# Patient Record
Sex: FEMALE | Race: WHITE | NOT HISPANIC OR LATINO | Employment: FULL TIME | URBAN - METROPOLITAN AREA
[De-identification: names, ages, dates, MRNs, and addresses within clinical notes are randomized per-mention and may not be internally consistent; named-entity substitution may affect disease eponyms.]

---

## 2018-08-28 ENCOUNTER — CONVERSION ENCOUNTER (OUTPATIENT)
Dept: INTERNAL MEDICINE | Facility: CLINIC | Age: 23
End: 2018-08-28

## 2018-08-28 ENCOUNTER — OFFICE VISIT CONVERTED (OUTPATIENT)
Dept: INTERNAL MEDICINE | Facility: CLINIC | Age: 23
End: 2018-08-28
Attending: INTERNAL MEDICINE

## 2019-07-29 ENCOUNTER — HOSPITAL ENCOUNTER (OUTPATIENT)
Dept: OTHER | Facility: HOSPITAL | Age: 24
Discharge: HOME | End: 2019-07-29
Attending: INTERNAL MEDICINE

## 2019-07-29 ENCOUNTER — OFFICE VISIT CONVERTED (OUTPATIENT)
Dept: INTERNAL MEDICINE | Facility: CLINIC | Age: 24
End: 2019-07-29
Attending: INTERNAL MEDICINE

## 2019-07-29 ENCOUNTER — CONVERSION ENCOUNTER (OUTPATIENT)
Dept: INTERNAL MEDICINE | Facility: CLINIC | Age: 24
End: 2019-07-29

## 2019-07-29 LAB
ALBUMIN SERPL-MCNC: 4.3 G/DL (ref 3.5–5)
ALBUMIN/GLOB SERPL: 1.3 {RATIO} (ref 1.4–2.6)
ALP SERPL-CCNC: 112 U/L (ref 42–98)
ALT SERPL-CCNC: 23 U/L (ref 10–40)
ANION GAP SERPL CALC-SCNC: 23 MMOL/L (ref 8–19)
AST SERPL-CCNC: 29 U/L (ref 15–50)
BASOPHILS # BLD AUTO: 0.06 10*3/UL (ref 0–0.2)
BASOPHILS NFR BLD AUTO: 1.2 % (ref 0–3)
BILIRUB SERPL-MCNC: 0.32 MG/DL (ref 0.2–1.3)
BUN SERPL-MCNC: 17 MG/DL (ref 5–25)
BUN/CREAT SERPL: 22 {RATIO} (ref 6–20)
CALCIUM SERPL-MCNC: 9.5 MG/DL (ref 8.7–10.4)
CHLORIDE SERPL-SCNC: 92 MMOL/L (ref 99–111)
CHOLEST SERPL-MCNC: 142 MG/DL (ref 107–200)
CHOLEST/HDLC SERPL: 2.4 {RATIO} (ref 3–6)
CONV ABS IMM GRAN: 0.03 10*3/UL (ref 0–0.2)
CONV CO2: 23 MMOL/L (ref 22–32)
CONV CREATININE URINE, RANDOM: 177.6 MG/DL (ref 10–300)
CONV IMMATURE GRAN: 0.6 % (ref 0–1.8)
CONV MICROALBUM.,U,RANDOM: <12 MG/L (ref 0–20)
CONV TOTAL PROTEIN: 7.5 G/DL (ref 6.3–8.2)
CREAT UR-MCNC: 0.76 MG/DL (ref 0.5–0.9)
DEPRECATED RDW RBC AUTO: 37.7 FL (ref 36.4–46.3)
EOSINOPHIL # BLD AUTO: 0.19 10*3/UL (ref 0–0.7)
EOSINOPHIL # BLD AUTO: 3.7 % (ref 0–7)
ERYTHROCYTE [DISTWIDTH] IN BLOOD BY AUTOMATED COUNT: 11.8 % (ref 11.7–14.4)
EST. AVERAGE GLUCOSE BLD GHB EST-MCNC: 235 MG/DL
FOLATE SERPL-MCNC: 15.9 NG/ML (ref 4.8–20)
GFR SERPLBLD BASED ON 1.73 SQ M-ARVRAT: >60 ML/MIN/{1.73_M2}
GLOBULIN UR ELPH-MCNC: 3.2 G/DL (ref 2–3.5)
GLUCOSE SERPL-MCNC: 339 MG/DL (ref 65–99)
HBA1C MFR BLD: 13.4 G/DL (ref 12–16)
HBA1C MFR BLD: 9.8 % (ref 3.5–5.7)
HCT VFR BLD AUTO: 40.4 % (ref 37–47)
HDLC SERPL-MCNC: 58 MG/DL (ref 40–60)
IRON SATN MFR SERPL: 13 % (ref 20–55)
IRON SERPL-MCNC: 54 UG/DL (ref 60–170)
LDLC SERPL CALC-MCNC: 71 MG/DL (ref 70–100)
LYMPHOCYTES # BLD AUTO: 2.24 10*3/UL (ref 1–5)
MAGNESIUM SERPL-MCNC: 1.69 MG/DL (ref 1.6–2.3)
MCH RBC QN AUTO: 29.6 PG (ref 27–31)
MCHC RBC AUTO-ENTMCNC: 33.2 G/DL (ref 33–37)
MCV RBC AUTO: 89.2 FL (ref 81–99)
MICROALBUMIN/CREAT UR: 6.8 MG/G{CRE} (ref 0–35)
MONOCYTES # BLD AUTO: 0.56 10*3/UL (ref 0.2–1.2)
MONOCYTES NFR BLD AUTO: 10.8 % (ref 3–10)
NEUTROPHILS # BLD AUTO: 2.09 10*3/UL (ref 2–8)
NEUTROPHILS NFR BLD AUTO: 40.4 % (ref 30–85)
NRBC CBCN: 0 % (ref 0–0.7)
OSMOLALITY SERPL CALC.SUM OF ELEC: 293 MOSM/KG (ref 273–304)
PLATELET # BLD AUTO: 402 10*3/UL (ref 130–400)
PMV BLD AUTO: 11 FL (ref 9.4–12.3)
POTASSIUM SERPL-SCNC: 4.1 MMOL/L (ref 3.5–5.3)
RBC # BLD AUTO: 4.53 10*6/UL (ref 4.2–5.4)
SODIUM SERPL-SCNC: 134 MMOL/L (ref 135–147)
T4 FREE SERPL-MCNC: 1.5 NG/DL (ref 0.9–1.8)
TIBC SERPL-MCNC: 430 UG/DL (ref 245–450)
TRANSFERRIN SERPL-MCNC: 301 MG/DL (ref 250–380)
TRIGL SERPL-MCNC: 67 MG/DL (ref 40–150)
TSH SERPL-ACNC: 3.46 M[IU]/L (ref 0.27–4.2)
VARIANT LYMPHS NFR BLD MANUAL: 43.3 % (ref 20–45)
VIT B12 SERPL-MCNC: 324 PG/ML (ref 211–911)
VLDLC SERPL-MCNC: 13 MG/DL (ref 5–37)
WBC # BLD AUTO: 5.17 10*3/UL (ref 4.8–10.8)

## 2019-07-31 LAB — CONV ANTI NUCLEAR ANTIBODY WITH REFLEX: NEGATIVE

## 2019-08-29 ENCOUNTER — OFFICE VISIT CONVERTED (OUTPATIENT)
Dept: DIABETES SERVICES | Facility: HOSPITAL | Age: 24
End: 2019-08-29
Attending: NURSE PRACTITIONER

## 2019-08-29 ENCOUNTER — HOSPITAL ENCOUNTER (OUTPATIENT)
Dept: DIABETES SERVICES | Facility: HOSPITAL | Age: 24
Discharge: HOME | End: 2019-08-29
Attending: NURSE PRACTITIONER

## 2021-05-15 VITALS
BODY MASS INDEX: 25.16 KG/M2 | HEART RATE: 103 BPM | RESPIRATION RATE: 16 BRPM | WEIGHT: 166 LBS | OXYGEN SATURATION: 99 % | SYSTOLIC BLOOD PRESSURE: 108 MMHG | HEIGHT: 68 IN | TEMPERATURE: 97.8 F | DIASTOLIC BLOOD PRESSURE: 72 MMHG

## 2021-05-16 VITALS
HEART RATE: 72 BPM | HEIGHT: 68 IN | SYSTOLIC BLOOD PRESSURE: 112 MMHG | DIASTOLIC BLOOD PRESSURE: 72 MMHG | OXYGEN SATURATION: 99 % | RESPIRATION RATE: 18 BRPM | TEMPERATURE: 97.6 F | WEIGHT: 171.5 LBS | BODY MASS INDEX: 25.99 KG/M2

## 2021-05-28 VITALS — HEIGHT: 68 IN | WEIGHT: 172.01 LBS | BODY MASS INDEX: 26.07 KG/M2

## 2021-05-28 NOTE — PROGRESS NOTES
Patient: ABELINO CENTENO     Acct: [id number]     Report: #[id number]  UNIT #: [id number]     : 1995    Encounter Date:2019  PRIMARY CARE: KALIE GREGORIO  ***Signed***  --------------------------------------------------------------------------------------------------------------------  Encounter Date      Aug 29, 2019            Reason for Visit      This patient is seen in the office today for evaluation for insulin pump     management.  She is a 23-year-old female patient with a history of type 1     diabetes x14 years.  The patient is referred to our office by her primary care     provider Dr. Gregorio.  The patient has been seen previously in the office but was     last seen in 2017.  The patient has had 2 hospitalizations due to     diabetic ketoacidosis in the last 2 months.  In both of those situations the     patient had concerns that her pump was not functioning properly however when she    was evaluated during her inpatient stays the pump was working properly but it     appeared that the patient may not have been compliant with her device.  This     patient has a history with difficulty complying with her pump therapy.  The     patient states that she essentially has no dietary restrictions and she says she    sometimes goes days without eating because of her depression.  At this visit her    glucose was checked and it was 501 mg/dL.  She states that she drank a regular     Coke prior to arrival.  I had the patient enter this glucose level in her pump     and take an insulin bolus for correction.  We uploaded her insulin pump but     there were no glucose levels or carbohydrates entered into the device for review    other than the glucose level 500 and one that was just now entered.  She states     that she only checks her glucose levels if she feels like she might be high or     low or she is feeling bad.  She admits to episodes of nocturnal hypoglycemia on     her days off from  "work.  She works a  on the night shift at the     local sn[employer] center.  An A1c collected on July 29, 2019 was 9.8% which was     down slightly from the previous A1c of 10.1% in June 2019.            The patient has recently started treatment with Lexapro for depression.  She     states that she has gone through a \"bad patch\".  She states \"I do not care     anymore\" but she denies suicidal ideation.  She does however state that if she     did not have her daughter she is not so certain that she would not be thinking     that way.  She does not disclose the source of her depression but she is tearful    throughout the visit.  She has multiple scabbed over wounds on her arms where     she states that during high anxiety she picks at her skin and causes these     wounds.            Lab Results            Item Value  Date Time             Hemoglobin A1c 9.8 % H 7/29/19 1348             Urine Random Microalbumin <12.0 mg/L 7/29/19 1348            History            History: Diagnosed with depression otherwise she denies any health issues or     changes since last being seen.            Allergies/Medications      Allergies:        Coded Allergies:             NO KNOWN DRUG ALLERGIES (Verified  Allergy, Unknown, 1/13/13)      Medications    Last Reconciled on 9/15/19 3:35 pm by CAMERON PLAZA      Escitalopram Oxalate (Escitalopram Oxalate*) 10 Mg Tablet      10 MG PO QDAY, TAB         Reported         9/15/19       INSULIN PUMP (at home) (INSULIN PUMP (at home))  Each      EACH, BAG         Reported         12/11/18            EXAM      EXAM: Skin examination shows multiple small round shaped wounds on both forearms    and varying states of healing.  The patient reports this is from her picking at     her skin during anxiety.  She denies any other form of self-harm.            Blood Glucose: 501      HT/WT/BMI:             Height 5 ft 8 in / 172.72 cm           Weight 172 lbs 0.2 oz / 78.209139 kg          "  BSA 1.92 m2           BMI 26.2 kg/m2            Quality Measures      Current yr A1c more than 9:  Yes      Neg ua mAlb this yr in chart:  Yes            Impression      Type 1 diabetes uncontrolled with noncompliance, depression, anxiety            Plan            A lengthy discussion was held with the patient regarding factors contributing to    her noncompliance and her frequent admissions for DKA.  We discussed her     emotional state contributing to her noncompliance.  The patient is being treated    by her primary care provider for this condition.  She was encouraged to consider    counseling or spiritual counseling to help her cope with what ever issues are     causing the stress and anxiety in her life.  The patient is very competent and     knowledgeable about the correct way to use her device however she continues to     fail to do so.  The patient states her insulin pump is out of warranty and she     would like to try a new device to see if this might make her feel more secure.      She is undecided if she would like to upgrade to a Medtronic insulin pump or a     tandem insulin pump.  Materials regarding these 2 devices were provided for the     patient and she will contact our office to let us know what device she is     determined that she would like to receive.  We will then submit the documents to    the insurance to see if we can get her pump upgraded.  The patient is uncertain     if she would like to use a continuous glucose sensor with her insulin pump.  She    will be scheduled for a follow-up appointment in 6 weeks for reevaluation.            Total time spent with patient was 40 minutes of which half of that time was     spent counseling the patient regarding compliance, diet, DKA prevention, and     coping.            PREVENTION      Hx Influenza Vaccination:  No      Influenza Vaccine Declined:  No      2 or More Falls Past Year?:  No      Fall Past Year with Injury?:  No      Hx  Pneumococcal Vaccination:  No      Encouraged to follow-up with:  PCP regarding preventative exams.                 Disclaimer: Converted document may not contain table formatting or lab diagrams. Please see [employer] for the authenticated document.

## 2021-08-05 ENCOUNTER — TELEPHONE (OUTPATIENT)
Dept: INTERNAL MEDICINE | Facility: CLINIC | Age: 26
End: 2021-08-05

## 2021-08-05 NOTE — TELEPHONE ENCOUNTER
"PATIENT ALSO WANTED TO REQUEST \"MINI MED PABLO\" PATIENT STATED THEY WERE PORTS THAT WENT ON HER SKIN.     [facility]/pharmacy #[id number] - [place], [place] - [address] - [phone] [facility] [phone]   [phone]    "

## 2021-08-05 NOTE — TELEPHONE ENCOUNTER
Dr. Callaway are you comfortable sending in medication for this patient you have not seen her in two years but has a appointment in Sept. Please advise.

## 2021-08-11 NOTE — TELEPHONE ENCOUNTER
Pharmacy does not have record of filling this so I don't know the dosing information.  I called and left message for patient to return call with dosage info and pharmacy it was last filled.    HUB PLEASE WARM TRANSFER

## 2021-08-12 RX ORDER — INFUSION SET FOR INSULIN PUMP
10 INFUSION SETS-PARAPHERNALIA MISCELLANEOUS
Qty: 30 EACH | Refills: 0 | Status: SHIPPED | OUTPATIENT
Start: 2021-08-12

## 2021-08-12 NOTE — TELEPHONE ENCOUNTER
Discussed with patient and verified in old emr. Patient was using mail order now using local pharmacy

## 2021-08-23 PROCEDURE — U0003 INFECTIOUS AGENT DETECTION BY NUCLEIC ACID (DNA OR RNA); SEVERE ACUTE RESPIRATORY SYNDROME CORONAVIRUS 2 (SARS-COV-2) (CORONAVIRUS DISEASE [COVID-19]), AMPLIFIED PROBE TECHNIQUE, MAKING USE OF HIGH THROUGHPUT TECHNOLOGIES AS DESCRIBED BY CMS-2020-01-R: HCPCS | Performed by: NURSE PRACTITIONER

## 2021-08-25 ENCOUNTER — TELEPHONE (OUTPATIENT)
Dept: URGENT CARE | Facility: CLINIC | Age: 26
End: 2021-08-25

## 2021-09-15 ENCOUNTER — OFFICE VISIT (OUTPATIENT)
Dept: INTERNAL MEDICINE | Facility: CLINIC | Age: 26
End: 2021-09-15

## 2021-09-15 VITALS
SYSTOLIC BLOOD PRESSURE: 118 MMHG | RESPIRATION RATE: 14 BRPM | HEIGHT: 68 IN | BODY MASS INDEX: 26.98 KG/M2 | OXYGEN SATURATION: 98 % | HEART RATE: 95 BPM | WEIGHT: 178 LBS | DIASTOLIC BLOOD PRESSURE: 72 MMHG | TEMPERATURE: 98 F

## 2021-09-15 DIAGNOSIS — F60.3 BORDERLINE PERSONALITY DISORDER (HCC): ICD-10-CM

## 2021-09-15 DIAGNOSIS — F41.9 ANXIETY: ICD-10-CM

## 2021-09-15 DIAGNOSIS — F33.1 MAJOR DEPRESSIVE DISORDER, RECURRENT, MODERATE (HCC): ICD-10-CM

## 2021-09-15 DIAGNOSIS — F31.81 BIPOLAR II DISORDER (HCC): ICD-10-CM

## 2021-09-15 DIAGNOSIS — E10.9 TYPE 1 DIABETES MELLITUS WITHOUT COMPLICATION (HCC): Primary | ICD-10-CM

## 2021-09-15 PROBLEM — F31.9 BIPOLAR DISORDER: Status: ACTIVE | Noted: 2021-09-15

## 2021-09-15 PROBLEM — F32.A DEPRESSION: Status: ACTIVE | Noted: 2021-09-15

## 2021-09-15 LAB
BASOPHILS # BLD AUTO: 0.05 10*3/MM3 (ref 0–0.2)
BASOPHILS NFR BLD AUTO: 0.9 % (ref 0–1.5)
CHOLEST SERPL-MCNC: 147 MG/DL (ref 0–200)
DEPRECATED RDW RBC AUTO: 38.1 FL (ref 37–54)
EOSINOPHIL # BLD AUTO: 0.1 10*3/MM3 (ref 0–0.4)
EOSINOPHIL NFR BLD AUTO: 1.7 % (ref 0.3–6.2)
ERYTHROCYTE [DISTWIDTH] IN BLOOD BY AUTOMATED COUNT: 11.6 % (ref 12.3–15.4)
HBA1C MFR BLD: 8.55 % (ref 4.8–5.6)
HCT VFR BLD AUTO: 40.7 % (ref 34–46.6)
HDLC SERPL-MCNC: 61 MG/DL (ref 40–60)
HGB BLD-MCNC: 13.3 G/DL (ref 12–15.9)
IMM GRANULOCYTES # BLD AUTO: 0.02 10*3/MM3 (ref 0–0.05)
IMM GRANULOCYTES NFR BLD AUTO: 0.3 % (ref 0–0.5)
LDLC SERPL CALC-MCNC: 73 MG/DL (ref 0–100)
LDLC/HDLC SERPL: 1.19 {RATIO}
LYMPHOCYTES # BLD AUTO: 2.63 10*3/MM3 (ref 0.7–3.1)
LYMPHOCYTES NFR BLD AUTO: 45.9 % (ref 19.6–45.3)
MCH RBC QN AUTO: 29.8 PG (ref 26.6–33)
MCHC RBC AUTO-ENTMCNC: 32.7 G/DL (ref 31.5–35.7)
MCV RBC AUTO: 91.1 FL (ref 79–97)
MONOCYTES # BLD AUTO: 0.56 10*3/MM3 (ref 0.1–0.9)
MONOCYTES NFR BLD AUTO: 9.8 % (ref 5–12)
NEUTROPHILS NFR BLD AUTO: 2.37 10*3/MM3 (ref 1.7–7)
NEUTROPHILS NFR BLD AUTO: 41.4 % (ref 42.7–76)
NRBC BLD AUTO-RTO: 0 /100 WBC (ref 0–0.2)
PLATELET # BLD AUTO: 361 10*3/MM3 (ref 140–450)
PMV BLD AUTO: 10.8 FL (ref 6–12)
RBC # BLD AUTO: 4.47 10*6/MM3 (ref 3.77–5.28)
TRIGL SERPL-MCNC: 66 MG/DL (ref 0–150)
VLDLC SERPL-MCNC: 13 MG/DL (ref 5–40)
WBC # BLD AUTO: 5.73 10*3/MM3 (ref 3.4–10.8)

## 2021-09-15 PROCEDURE — 99214 OFFICE O/P EST MOD 30 MIN: CPT | Performed by: INTERNAL MEDICINE

## 2021-09-15 PROCEDURE — 84439 ASSAY OF FREE THYROXINE: CPT | Performed by: INTERNAL MEDICINE

## 2021-09-15 PROCEDURE — 80061 LIPID PANEL: CPT | Performed by: INTERNAL MEDICINE

## 2021-09-15 PROCEDURE — 85025 COMPLETE CBC W/AUTO DIFF WBC: CPT | Performed by: INTERNAL MEDICINE

## 2021-09-15 PROCEDURE — 80053 COMPREHEN METABOLIC PANEL: CPT | Performed by: INTERNAL MEDICINE

## 2021-09-15 PROCEDURE — 84443 ASSAY THYROID STIM HORMONE: CPT | Performed by: INTERNAL MEDICINE

## 2021-09-15 PROCEDURE — 83036 HEMOGLOBIN GLYCOSYLATED A1C: CPT | Performed by: INTERNAL MEDICINE

## 2021-09-15 RX ORDER — CARIPRAZINE 1.5 MG-3MG
1 KIT ORAL DAILY
Qty: 1 EACH | Refills: 2 | Status: SHIPPED | OUTPATIENT
Start: 2021-09-15 | End: 2021-10-15

## 2021-09-15 NOTE — PROGRESS NOTES
"Chief Complaint  Diabetes    Subjective          Ely Tessie Burciaga presents to [facility]  History of Present Illness    \"I want to get my insulin straightened out\"  She has a new insulin pump    zoloft didn't work at all  effexor was horrible gave her nightmares and made her anxiety worse    Hasn't tried wellbutrin, her Mom and sister did well with that    Very depressed currently  States that taking her own life gets into her brain all the time  She isn't    \"I pushed everyone away\"    Living with her parents  Difficultly with getting up off the couch    Working at [employer]    Objective   Vital Signs:   /72   Pulse 95   Temp 98 °F (36.7 °C)   Resp 14   Ht 172.7 cm (68\")   Wt 80.7 kg (178 lb)   SpO2 98%   BMI 27.06 kg/m²     Physical Exam  Vitals reviewed.   Constitutional:       Appearance: Normal appearance. She is well-developed.   HENT:      Head: Normocephalic and atraumatic.      Right Ear: External ear normal.      Left Ear: External ear normal.      Mouth/Throat:      Pharynx: No oropharyngeal exudate.   Eyes:      Conjunctiva/sclera: Conjunctivae normal.      Pupils: Pupils are equal, round, and reactive to light.   Cardiovascular:      Rate and Rhythm: Normal rate and regular rhythm.      Heart sounds: No murmur heard.   No friction rub. No gallop.    Pulmonary:      Effort: Pulmonary effort is normal.      Breath sounds: Normal breath sounds. No wheezing or rhonchi.   Skin:     General: Skin is warm and dry.   Neurological:      Mental Status: She is alert and oriented to person, place, and time.      Cranial Nerves: No cranial nerve deficit.   Psychiatric:         Mood and Affect: Affect normal.         Behavior: Behavior normal.         Thought Content: Thought content normal.        Result Review :     Common labs    Common Labsle 9/15/21 9/15/21 9/15/21 9/15/21    1314 1314 1314 1314   Glucose    367 (A)   BUN    15   Creatinine    0.70 "   eGFR Non African Am    100   Sodium    134 (A)   Potassium    4.7   Chloride    97 (A)   Calcium    9.5   Albumin    4.60   Total Bilirubin    0.4   Alkaline Phosphatase    103   AST (SGOT)    15   ALT (SGPT)    14   WBC  5.73     Hemoglobin  13.3     Hematocrit  40.7     Platelets  361     Total Cholesterol   147    Triglycerides   66    HDL Cholesterol   61 (A)    LDL Cholesterol    73    Hemoglobin A1C 8.55 (A)      (A) Abnormal value               Procedures      Assessment and Plan    Diagnoses and all orders for this visit:    1. Type 1 diabetes mellitus without complication (CMS/HCC) (Primary)  Comments:  will refer to Caro Bhagat and also cat in [place] we will check labs today and adjust meds as needed based on result  Orders:  -     Ambulatory Referral to Diabetic Education  -     Ambulatory Referral to Endocrinology  -     CBC & Differential  -     Comprehensive Metabolic Panel  -     Lipid Panel  -     Hemoglobin A1c  -     TSH  -     T4, Free  -     Ambulatory Referral for Diabetic Eye Exam-Ophthalmology    2. Bipolar II disorder (CMS/HCC)    3. Borderline personality disorder (CMS/HCC)  Comments:  Will try starting Vraylar strongly encouraged counseling    4. Anxiety    5. Major depressive disorder, recurrent, moderate (CMS/HCC)  Comments:  Has passive SI but agrees to not take her own life due to the fact that her daughter is her anchor she will let us know if these feelings worsen    Other orders  -     Cariprazine HCl (Vraylar) 1.5 & 3 MG capsule therapy; Take 1 package by mouth Daily for 30 days.  Dispense: 1 each; Refill: 2  -     insulin lispro (HumaLOG) 100 UNIT/ML injection; USE IN PUMP. MAXIMUM DAILY DOSE  UNITS DAILY  Dispense: 30 mL; Refill: 1              Follow Up   Return in about 1 month (around 10/15/2021).  Patient was given instructions and counseling regarding her condition or for health maintenance advice. Please see specific information pulled into the AVS if  appropriate.

## 2021-09-16 LAB
ALBUMIN SERPL-MCNC: 4.6 G/DL (ref 3.5–5.2)
ALBUMIN/GLOB SERPL: 1.6 G/DL
ALP SERPL-CCNC: 103 U/L (ref 39–117)
ALT SERPL W P-5'-P-CCNC: 14 U/L (ref 1–33)
ANION GAP SERPL CALCULATED.3IONS-SCNC: 11.2 MMOL/L (ref 5–15)
AST SERPL-CCNC: 15 U/L (ref 1–32)
BILIRUB SERPL-MCNC: 0.4 MG/DL (ref 0–1.2)
BUN SERPL-MCNC: 15 MG/DL (ref 6–20)
BUN/CREAT SERPL: 21.4 (ref 7–25)
CALCIUM SPEC-SCNC: 9.5 MG/DL (ref 8.6–10.5)
CHLORIDE SERPL-SCNC: 97 MMOL/L (ref 98–107)
CO2 SERPL-SCNC: 25.8 MMOL/L (ref 22–29)
CREAT SERPL-MCNC: 0.7 MG/DL (ref 0.57–1)
GFR SERPL CREATININE-BSD FRML MDRD: 100 ML/MIN/1.73
GLOBULIN UR ELPH-MCNC: 2.9 GM/DL
GLUCOSE SERPL-MCNC: 367 MG/DL (ref 65–99)
POTASSIUM SERPL-SCNC: 4.7 MMOL/L (ref 3.5–5.2)
PROT SERPL-MCNC: 7.5 G/DL (ref 6–8.5)
SODIUM SERPL-SCNC: 134 MMOL/L (ref 136–145)
T4 FREE SERPL-MCNC: 1.09 NG/DL (ref 0.93–1.7)
TSH SERPL DL<=0.05 MIU/L-ACNC: 5.2 UIU/ML (ref 0.27–4.2)

## 2021-09-17 ENCOUNTER — TELEPHONE (OUTPATIENT)
Dept: INTERNAL MEDICINE | Facility: CLINIC | Age: 26
End: 2021-09-17

## 2021-09-17 NOTE — TELEPHONE ENCOUNTER
Vraylar is needing a PA, do you want a different medication or to do a PA on this one. Please advise.

## 2021-09-21 ENCOUNTER — PRIOR AUTHORIZATION (OUTPATIENT)
Dept: INTERNAL MEDICINE | Facility: CLINIC | Age: 26
End: 2021-09-21

## 2021-10-05 ENCOUNTER — OFFICE VISIT (OUTPATIENT)
Dept: DIABETES SERVICES | Facility: HOSPITAL | Age: 26
End: 2021-10-05

## 2021-10-05 VITALS
HEIGHT: 68 IN | WEIGHT: 178.57 LBS | HEART RATE: 99 BPM | BODY MASS INDEX: 27.06 KG/M2 | OXYGEN SATURATION: 96 % | TEMPERATURE: 98.1 F | SYSTOLIC BLOOD PRESSURE: 124 MMHG | DIASTOLIC BLOOD PRESSURE: 73 MMHG

## 2021-10-05 DIAGNOSIS — F33.1 MAJOR DEPRESSIVE DISORDER, RECURRENT, MODERATE (HCC): ICD-10-CM

## 2021-10-05 DIAGNOSIS — E10.65 UNCONTROLLED TYPE 1 DIABETES MELLITUS WITH HYPERGLYCEMIA (HCC): Primary | ICD-10-CM

## 2021-10-05 DIAGNOSIS — E66.3 OVERWEIGHT (BMI 25.0-29.9): ICD-10-CM

## 2021-10-05 LAB — GLUCOSE BLDC GLUCOMTR-MCNC: 278 MG/DL (ref 70–99)

## 2021-10-05 PROCEDURE — 99214 OFFICE O/P EST MOD 30 MIN: CPT | Performed by: NURSE PRACTITIONER

## 2021-10-05 PROCEDURE — 82962 GLUCOSE BLOOD TEST: CPT | Performed by: NURSE PRACTITIONER

## 2021-10-05 PROCEDURE — G0463 HOSPITAL OUTPT CLINIC VISIT: HCPCS | Performed by: NURSE PRACTITIONER

## 2021-10-05 RX ORDER — LANCETS
1 EACH MISCELLANEOUS 4 TIMES DAILY
Qty: 102 EACH | Refills: 11 | Status: SHIPPED | OUTPATIENT
Start: 2021-10-05

## 2021-10-05 NOTE — PROGRESS NOTES
"Chief Complaint  Diabetes (has a new pump,having low readings that are different and \"just drop\"  and shes \"not herself\" )    Referred By: Shilpa Callaway MD    Subjective          Ely Burciaga presents to [facility] for insulin pump management    History of Present Illness    Visit type:  follow-up  Diabetes type:  Type 1  Current diabetes status/concerns/issues: This patient returns to the office after having not been seen since August 2019.  She brings a new Medtronic 670 insulin pump with her at today's appointment.  This pump was ordered for her back in 2019 but the patient never returned to the office for training on the device.  She states it has been stored in her closet all of this time but she would like to get trained on it now.  She is currently using an older model Medtronic pump.  Other health concerns: Patient states she has been dealing with severe depression over the last couple of years.  She has struggled with ongoing persistent suicidal ideation.  She states that she did not have thoughts of suddenly killing herself but rather try to stop taking her insulin to see if she would ultimately just die because of neglected diabetes.  She states these thoughts are constant but she is trying to get help.  She denies having any suicidal plans at this time.  Her PCP has recently prescribed a new medication and made referrals for psychiatric evaluation and treatment.  She is trying to become more independent and to be less of a burden on her family.  Diabetes symptoms:    Polyuria: No   Polydipsia: No   Polyphagia: No   Blurred vision: No   Excessive fatigue: No  Diabetes complications:  Neuropathy:No  Nephropathy:No  Retinopathy:No  Amputation/Wounds:No  Gastroparesis:No  Cardiovascular Disease:No  Erectile Dysfunction:N/A  Hypoglycemia:  Level 1 hypoglycemia (54 mg/dL - 70 mg/dL); Frequency - occasional episodes and Level 2 (less than 54 mg/dL); Frequency - has gone " "as low as 34 mg/dl before  Hypoglycemia Symptoms:  dizziness, sweating and Will feel mind fades in and out; has aura; will feel like she drops instantly  Current diabetes treatment: She is using a Medtronic insulin pump with Humalog insulin.  She is rarely taking a random bolus of insulin and does not enter any glucose values or carbohydrates into the pump  Blood glucose device: She has a blood glucose meter but needs new testing supplies.  She admits to not testing her blood sugar for an extended period of time now  Blood glucose monitoring frequency:  None  Blood glucose range/average: Unknown  Diet:  \"Eat what I want\", She feels like she could benefit from a revisit with a dietitian to help her get back to carbohydrate counting.  She is scheduled next week with the dietitian  Activity:  She tries to exercise at times; she stays active at work    Past Medical History:   Diagnosis Date   • Anxiety    • Bipolar disorder (HCC)    • Depression    • Diabetes (HCC)    • Diabetes mellitus type I (HCC)    • STD exposure      Past Surgical History:   Procedure Laterality Date   •  SECTION       Family History   Problem Relation Age of Onset   • Heart disease Other    • Prostate cancer Other      Social History     Socioeconomic History   • Marital status: Single     Spouse name: Not on file   • Number of children: Not on file   • Years of education: Not on file   • Highest education level: Not on file   Tobacco Use   • Smoking status: Never Smoker   • Smokeless tobacco: Never Used   Vaping Use   • Vaping Use: Every day   • Substances: Nicotine, Flavoring   • Devices: Disposable   Substance and Sexual Activity   • Alcohol use: Not Currently   • Drug use: Never     Comment: Denies substance abuse   • Sexual activity: Yes     Partners: Male     No Known Allergies    Current Outpatient Medications:   •  Cariprazine HCl (Vraylar) 1.5 & 3 MG capsule therapy, Take 1 package by mouth Daily for 30 days., Disp: 1 each, Rfl: " 2  •  Insulin Infusion Pump Supplies (MiniMed Ashvin Infusion Set) misc, 10 each Every 3 (Three) Days., Disp: 30 each, Rfl: 0  •  insulin lispro (HumaLOG) 100 UNIT/ML injection, USE IN PUMP. MAXIMUM DAILY DOSE  UNITS DAILY, Disp: 30 mL, Rfl: 1  •  levonorgestrel (Mirena, 52 MG,) 20 MCG/24HR IUD, , Disp: , Rfl:   •  Accu-Chek FastClix Lancets misc, 1 each 4 (Four) Times a Day., Disp: 102 each, Rfl: 11  •  Blood Glucose Monitoring Suppl device, 1 each 1 (One) Time for 1 dose., Disp: 1 each, Rfl: 0  •  glucose blood test strip, Test blood glucose 4 times a day and as needed, Disp: 150 each, Rfl: 5    Review of Systems   Constitutional: Positive for appetite change and fatigue. Negative for activity change, fever, unexpected weight gain and unexpected weight loss.   HENT: Negative for congestion, ear pain, facial swelling, hearing loss, sore throat and tinnitus.    Eyes: Negative for blurred vision, double vision, redness and visual disturbance.   Respiratory: Negative for cough, shortness of breath and wheezing.    Cardiovascular: Negative for chest pain, palpitations and leg swelling.   Gastrointestinal: Negative for abdominal distention, constipation, diarrhea, nausea, vomiting, GERD and indigestion.   Endocrine: Negative for polydipsia, polyphagia and polyuria.   Genitourinary: Negative for difficulty urinating, frequency and urgency.   Musculoskeletal: Negative for back pain, gait problem and myalgias.   Skin: Negative for rash, skin lesions and bruise.   Neurological: Negative for seizures, speech difficulty, weakness, headache and confusion.   Psychiatric/Behavioral: Positive for sleep disturbance, suicidal ideas (She has constant thoughts that if she were to die she would be fine with that but denies having any specific suicidal plans), depressed mood and stress. The patient is nervous/anxious.         Objective     Vitals:    10/05/21 0856   BP: 124/73   BP Location: Left arm   Patient Position: Sitting  "  Pulse: 99   Temp: 98.1 °F (36.7 °C)   SpO2: 96%   Weight: 81 kg (178 lb 9.2 oz)   Height: 172.7 cm (68\")   PainSc: 0-No pain     Body mass index is 27.15 kg/m².    Physical Exam  Constitutional:       Appearance: Normal appearance.      Comments: Overweight with BMI of 27.15   HENT:      Head: Normocephalic and atraumatic.      Right Ear: External ear normal.      Left Ear: External ear normal.      Nose: Nose normal.   Eyes:      Extraocular Movements: Extraocular movements intact.      Conjunctiva/sclera: Conjunctivae normal.   Pulmonary:      Effort: Pulmonary effort is normal.   Musculoskeletal:         General: Normal range of motion.      Cervical back: Normal range of motion.   Skin:     General: Skin is warm and dry.   Neurological:      General: No focal deficit present.      Mental Status: She is alert and oriented to person, place, and time. Mental status is at baseline.   Psychiatric:         Mood and Affect: Mood normal.         Behavior: Behavior normal.         Thought Content: Thought content normal.         Judgment: Judgment normal.      Comments: She openly admits to her thoughts of severe depression and freely discusses her feelings.         Result Review :   The following data was reviewed by: JI Lilly on 10/05/2021:        Her most recent A1c was collected on 9/15/2021 was 8.55% indicating uncontrolled type 1 diabetes    Most Recent A1C    HGBA1C Most Recent 9/15/21   Hemoglobin A1C 8.55 (A)   (A) Abnormal value              A1C Last 3 Results    HGBA1C Last 3 Results 9/15/21   Hemoglobin A1C 8.55 (A)   (A) Abnormal value              Glucose   Date Value Ref Range Status   10/05/2021 278 (H) 70 - 99 mg/dL Final             Assessment: Patient is eager to get on the new device and to try to refocus on managing her diabetes so that she can place most of her attention on improving her mental health.  The patient was open in her discussion about her major depression.  She agrees " to not use the insulin pump as an instrument for self-harm.      Diagnoses and all orders for this visit:    1. Uncontrolled type 1 diabetes mellitus with hyperglycemia (HCC) (Primary)    2. Overweight (BMI 25.0-29.9)    3. Major depressive disorder, recurrent, moderate (HCC)    Other orders  -     POC Glucose  -     glucose blood test strip; Test blood glucose 4 times a day and as needed  Dispense: 150 each; Refill: 5  -     Accu-Chek FastClix Lancets misc; 1 each 4 (Four) Times a Day.  Dispense: 102 each; Refill: 11  -     Blood Glucose Monitoring Suppl device; 1 each 1 (One) Time for 1 dose.  Dispense: 1 each; Refill: 0        Plan: The patient will return to the office for training on the insulin pump.  We will need to renew the supplies for the continuous glucose sensor as the current device that she has on hand are out of date.  Patient also needs renewal of all of her pump supplies.  We will submit documents to the insurance for these items.  A prescription for blood glucose meter and testing supplies was sent to her pharmacy and the patient was strongly encouraged to begin engaging with her pump so we can evaluate the current settings to determine appropriate settings to be established in the new pump.    The patient will monitor her blood glucose levels 3-4 times each day.  If she develops hypoglycemia or experiences any increased frequency or severity of hypoglycemia, she will contact the office for further instructions.        Follow Up     Return will call, for Pump Start.    Patient was given instructions and counseling regarding her condition or for health maintenance advice. Please see specific information pulled into the AVS if appropriate.     Caro Singh, APRN  10/05/2021

## 2021-10-12 ENCOUNTER — APPOINTMENT (OUTPATIENT)
Dept: DIABETES SERVICES | Facility: HOSPITAL | Age: 26
End: 2021-10-12

## 2021-10-25 PROCEDURE — U0004 COV-19 TEST NON-CDC HGH THRU: HCPCS | Performed by: PHYSICIAN ASSISTANT

## 2021-11-01 ENCOUNTER — TELEPHONE (OUTPATIENT)
Dept: DIABETES SERVICES | Facility: HOSPITAL | Age: 26
End: 2021-11-01

## 2021-12-16 ENCOUNTER — APPOINTMENT (OUTPATIENT)
Dept: DIABETES SERVICES | Facility: HOSPITAL | Age: 26
End: 2021-12-16

## 2022-01-21 ENCOUNTER — TELEPHONE (OUTPATIENT)
Dept: INTERNAL MEDICINE | Facility: CLINIC | Age: 27
End: 2022-01-21

## 2022-01-21 NOTE — TELEPHONE ENCOUNTER
Caller: Ely Burciaga    Relationship: Self    Best call back number: [phone]    Requested Prescriptions:   ANTIBIOTIC     Pharmacy where request should be sent:   [facility]/pharmacy #[id number] - [place], [place] - [address] - [phone]  - [phone] FX  [phone]    Additional details provided by patient: PATIENT CALLED TO REQUEST MEDICATION TO HELP WITH URINARY ISSUES. SHE IS HAVING CRAMPING, ODOR WHEN PEEING, URGENCY, AND URINE IS CLOUDY. SHE HAS TRIED HOME REMEDIES BY DRINKING CRANBERRY JUICE AND IT IS NOT IMPROVING AT ALL. SHE IS REQUESTING MEDICATION SENT TO PHARMACY AS SOON AS POSSIBLE.       Audrey Granda, Gertrude Rep   01/21/22 17:02 EST

## 2022-01-24 DIAGNOSIS — R31.9 HEMATURIA, UNSPECIFIED TYPE: Primary | ICD-10-CM

## 2022-01-25 ENCOUNTER — APPOINTMENT (OUTPATIENT)
Dept: CT IMAGING | Facility: HOSPITAL | Age: 27
End: 2022-01-25

## 2022-01-25 ENCOUNTER — HOSPITAL ENCOUNTER (EMERGENCY)
Facility: HOSPITAL | Age: 27
Discharge: HOME | End: 2022-01-25
Attending: EMERGENCY MEDICINE | Admitting: EMERGENCY MEDICINE

## 2022-01-25 ENCOUNTER — OFFICE VISIT (OUTPATIENT)
Dept: INTERNAL MEDICINE | Facility: CLINIC | Age: 27
End: 2022-01-25

## 2022-01-25 VITALS
HEART RATE: 108 BPM | WEIGHT: 180 LBS | SYSTOLIC BLOOD PRESSURE: 112 MMHG | OXYGEN SATURATION: 98 % | HEIGHT: 68 IN | TEMPERATURE: 97.8 F | DIASTOLIC BLOOD PRESSURE: 60 MMHG | BODY MASS INDEX: 27.28 KG/M2 | RESPIRATION RATE: 15 BRPM

## 2022-01-25 VITALS
OXYGEN SATURATION: 100 % | HEIGHT: 68 IN | WEIGHT: 179.01 LBS | TEMPERATURE: 98.5 F | BODY MASS INDEX: 27.13 KG/M2 | RESPIRATION RATE: 16 BRPM | DIASTOLIC BLOOD PRESSURE: 88 MMHG | SYSTOLIC BLOOD PRESSURE: 127 MMHG | HEART RATE: 100 BPM

## 2022-01-25 DIAGNOSIS — R10.31 ABDOMINAL PAIN, ACUTE, BILATERAL LOWER QUADRANT: Primary | ICD-10-CM

## 2022-01-25 DIAGNOSIS — E10.9 TYPE 1 DIABETES MELLITUS WITHOUT COMPLICATION: Primary | ICD-10-CM

## 2022-01-25 DIAGNOSIS — U07.1 COVID-19: ICD-10-CM

## 2022-01-25 DIAGNOSIS — R10.0 ACUTE ABDOMEN: ICD-10-CM

## 2022-01-25 DIAGNOSIS — R19.7 DIARRHEA, UNSPECIFIED TYPE: ICD-10-CM

## 2022-01-25 DIAGNOSIS — R10.84 GENERALIZED ABDOMINAL PAIN: ICD-10-CM

## 2022-01-25 DIAGNOSIS — R19.7 NAUSEA, VOMITING, AND DIARRHEA: ICD-10-CM

## 2022-01-25 DIAGNOSIS — R11.2 NAUSEA, VOMITING, AND DIARRHEA: ICD-10-CM

## 2022-01-25 DIAGNOSIS — R10.32 ABDOMINAL PAIN, ACUTE, BILATERAL LOWER QUADRANT: Primary | ICD-10-CM

## 2022-01-25 DIAGNOSIS — R11.0 NAUSEA: ICD-10-CM

## 2022-01-25 DIAGNOSIS — N83.202 LEFT OVARIAN CYST: ICD-10-CM

## 2022-01-25 LAB
ALBUMIN SERPL-MCNC: 5 G/DL (ref 3.5–5.2)
ALBUMIN/GLOB SERPL: 1.7 G/DL
ALP SERPL-CCNC: 92 U/L (ref 39–117)
ALT SERPL W P-5'-P-CCNC: 18 U/L (ref 1–33)
ANION GAP SERPL CALCULATED.3IONS-SCNC: 12.1 MMOL/L (ref 5–15)
AST SERPL-CCNC: 18 U/L (ref 1–32)
BASOPHILS # BLD AUTO: 0.07 10*3/MM3 (ref 0–0.2)
BASOPHILS NFR BLD AUTO: 0.9 % (ref 0–1.5)
BILIRUB SERPL-MCNC: 0.6 MG/DL (ref 0–1.2)
BILIRUB UR QL STRIP: NEGATIVE
BILIRUB UR QL STRIP: NEGATIVE
BUN SERPL-MCNC: 20 MG/DL (ref 6–20)
BUN/CREAT SERPL: 29 (ref 7–25)
CALCIUM SPEC-SCNC: 10.5 MG/DL (ref 8.6–10.5)
CHLORIDE SERPL-SCNC: 99 MMOL/L (ref 98–107)
CLARITY UR: CLEAR
CLARITY UR: CLEAR
CO2 SERPL-SCNC: 25.9 MMOL/L (ref 22–29)
COLOR UR: YELLOW
COLOR UR: YELLOW
CREAT SERPL-MCNC: 0.69 MG/DL (ref 0.57–1)
DEPRECATED RDW RBC AUTO: 37.2 FL (ref 37–54)
EOSINOPHIL # BLD AUTO: 0.14 10*3/MM3 (ref 0–0.4)
EOSINOPHIL NFR BLD AUTO: 1.9 % (ref 0.3–6.2)
ERYTHROCYTE [DISTWIDTH] IN BLOOD BY AUTOMATED COUNT: 11.8 % (ref 12.3–15.4)
GFR SERPL CREATININE-BSD FRML MDRD: 101 ML/MIN/1.73
GLOBULIN UR ELPH-MCNC: 3 GM/DL
GLUCOSE SERPL-MCNC: 172 MG/DL (ref 65–99)
GLUCOSE UR STRIP-MCNC: ABNORMAL MG/DL
GLUCOSE UR STRIP-MCNC: ABNORMAL MG/DL
HCG INTACT+B SERPL-ACNC: <0.5 MIU/ML
HCT VFR BLD AUTO: 40.1 % (ref 34–46.6)
HGB BLD-MCNC: 14.2 G/DL (ref 12–15.9)
HGB UR QL STRIP.AUTO: NEGATIVE
HGB UR QL STRIP.AUTO: NEGATIVE
HOLD SPECIMEN: NORMAL
HOLD SPECIMEN: NORMAL
IMM GRANULOCYTES # BLD AUTO: 0.03 10*3/MM3 (ref 0–0.05)
IMM GRANULOCYTES NFR BLD AUTO: 0.4 % (ref 0–0.5)
KETONES UR QL STRIP: ABNORMAL
KETONES UR QL STRIP: NEGATIVE
LEUKOCYTE ESTERASE UR QL STRIP.AUTO: NEGATIVE
LEUKOCYTE ESTERASE UR QL STRIP.AUTO: NEGATIVE
LIPASE SERPL-CCNC: 9 U/L (ref 13–60)
LYMPHOCYTES # BLD AUTO: 2.43 10*3/MM3 (ref 0.7–3.1)
LYMPHOCYTES NFR BLD AUTO: 32.9 % (ref 19.6–45.3)
MCH RBC QN AUTO: 30.7 PG (ref 26.6–33)
MCHC RBC AUTO-ENTMCNC: 35.4 G/DL (ref 31.5–35.7)
MCV RBC AUTO: 86.8 FL (ref 79–97)
MONOCYTES # BLD AUTO: 0.64 10*3/MM3 (ref 0.1–0.9)
MONOCYTES NFR BLD AUTO: 8.7 % (ref 5–12)
NEUTROPHILS NFR BLD AUTO: 4.08 10*3/MM3 (ref 1.7–7)
NEUTROPHILS NFR BLD AUTO: 55.2 % (ref 42.7–76)
NITRITE UR QL STRIP: NEGATIVE
NITRITE UR QL STRIP: NEGATIVE
NRBC BLD AUTO-RTO: 0 /100 WBC (ref 0–0.2)
PH UR STRIP.AUTO: <=5 [PH] (ref 5–8)
PH UR STRIP.AUTO: <=5 [PH] (ref 5–8)
PLATELET # BLD AUTO: 355 10*3/MM3 (ref 140–450)
PMV BLD AUTO: 9.5 FL (ref 6–12)
POTASSIUM SERPL-SCNC: 4 MMOL/L (ref 3.5–5.2)
PROT SERPL-MCNC: 8 G/DL (ref 6–8.5)
PROT UR QL STRIP: NEGATIVE
PROT UR QL STRIP: NEGATIVE
RBC # BLD AUTO: 4.62 10*6/MM3 (ref 3.77–5.28)
SARS-COV-2 RNA PNL SPEC NAA+PROBE: DETECTED
SODIUM SERPL-SCNC: 137 MMOL/L (ref 136–145)
SP GR UR STRIP: >1.03 (ref 1–1.03)
SP GR UR STRIP: >=1.03 (ref 1–1.03)
UROBILINOGEN UR QL STRIP: ABNORMAL
UROBILINOGEN UR QL STRIP: ABNORMAL
WBC NRBC COR # BLD: 7.39 10*3/MM3 (ref 3.4–10.8)
WHOLE BLOOD HOLD SPECIMEN: NORMAL
WHOLE BLOOD HOLD SPECIMEN: NORMAL

## 2022-01-25 PROCEDURE — C9803 HOPD COVID-19 SPEC COLLECT: HCPCS | Performed by: EMERGENCY MEDICINE

## 2022-01-25 PROCEDURE — 81003 URINALYSIS AUTO W/O SCOPE: CPT

## 2022-01-25 PROCEDURE — 74177 CT ABD & PELVIS W/CONTRAST: CPT

## 2022-01-25 PROCEDURE — 96374 THER/PROPH/DIAG INJ IV PUSH: CPT

## 2022-01-25 PROCEDURE — 96375 TX/PRO/DX INJ NEW DRUG ADDON: CPT

## 2022-01-25 PROCEDURE — 99283 EMERGENCY DEPT VISIT LOW MDM: CPT

## 2022-01-25 PROCEDURE — 25010000002 HYDROMORPHONE 1 MG/ML SOLUTION: Performed by: EMERGENCY MEDICINE

## 2022-01-25 PROCEDURE — 0 IOPAMIDOL PER 1 ML: Performed by: EMERGENCY MEDICINE

## 2022-01-25 PROCEDURE — 25010000002 MORPHINE PER 10 MG: Performed by: EMERGENCY MEDICINE

## 2022-01-25 PROCEDURE — 81003 URINALYSIS AUTO W/O SCOPE: CPT | Performed by: INTERNAL MEDICINE

## 2022-01-25 PROCEDURE — 84702 CHORIONIC GONADOTROPIN TEST: CPT

## 2022-01-25 PROCEDURE — 83690 ASSAY OF LIPASE: CPT

## 2022-01-25 PROCEDURE — 25010000002 ONDANSETRON PER 1 MG: Performed by: EMERGENCY MEDICINE

## 2022-01-25 PROCEDURE — 85025 COMPLETE CBC W/AUTO DIFF WBC: CPT

## 2022-01-25 PROCEDURE — U0004 COV-19 TEST NON-CDC HGH THRU: HCPCS | Performed by: EMERGENCY MEDICINE

## 2022-01-25 PROCEDURE — 80053 COMPREHEN METABOLIC PANEL: CPT

## 2022-01-25 PROCEDURE — 36415 COLL VENOUS BLD VENIPUNCTURE: CPT

## 2022-01-25 PROCEDURE — 99213 OFFICE O/P EST LOW 20 MIN: CPT | Performed by: PHYSICIAN ASSISTANT

## 2022-01-25 RX ORDER — ONDANSETRON 2 MG/ML
4 INJECTION INTRAMUSCULAR; INTRAVENOUS ONCE
Status: COMPLETED | OUTPATIENT
Start: 2022-01-25 | End: 2022-01-25

## 2022-01-25 RX ORDER — IBUPROFEN 800 MG/1
800 TABLET ORAL EVERY 8 HOURS PRN
Qty: 30 TABLET | Refills: 0 | OUTPATIENT
Start: 2022-01-25 | End: 2022-01-31

## 2022-01-25 RX ORDER — SODIUM CHLORIDE 0.9 % (FLUSH) 0.9 %
10 SYRINGE (ML) INJECTION AS NEEDED
Status: DISCONTINUED | OUTPATIENT
Start: 2022-01-25 | End: 2022-01-25 | Stop reason: HOSPADM

## 2022-01-25 RX ORDER — ONDANSETRON 4 MG/1
4 TABLET, ORALLY DISINTEGRATING ORAL EVERY 6 HOURS PRN
Qty: 12 TABLET | Refills: 0 | Status: SHIPPED | OUTPATIENT
Start: 2022-01-25 | End: 2022-06-06

## 2022-01-25 RX ORDER — HYDROCODONE BITARTRATE AND ACETAMINOPHEN 7.5; 325 MG/1; MG/1
1 TABLET ORAL EVERY 6 HOURS PRN
Qty: 12 TABLET | Refills: 0 | Status: SHIPPED | OUTPATIENT
Start: 2022-01-25 | End: 2022-01-31 | Stop reason: SDUPTHER

## 2022-01-25 RX ADMIN — SODIUM CHLORIDE 1000 ML: 9 INJECTION, SOLUTION INTRAVENOUS at 11:06

## 2022-01-25 RX ADMIN — IOPAMIDOL 100 ML: 755 INJECTION, SOLUTION INTRAVENOUS at 11:21

## 2022-01-25 RX ADMIN — ONDANSETRON 4 MG: 2 INJECTION INTRAMUSCULAR; INTRAVENOUS at 11:07

## 2022-01-25 RX ADMIN — MORPHINE SULFATE 4 MG: 4 INJECTION, SOLUTION INTRAMUSCULAR; INTRAVENOUS at 12:51

## 2022-01-25 RX ADMIN — HYDROMORPHONE HYDROCHLORIDE 1 MG: 1 INJECTION, SOLUTION INTRAMUSCULAR; INTRAVENOUS; SUBCUTANEOUS at 11:08

## 2022-01-25 NOTE — PROGRESS NOTES
Discussed positive COVID results with patient and/or parent/guardian. Advised to quarantine according to CDC guidelines and to seek further care if symptoms are worsening. Patient vocalized understanding and agreement with this plan.

## 2022-01-25 NOTE — PROGRESS NOTES
Chief Complaint  Abdominal Pain    Subjective          Ely Tessie Burciaga presents to [facility]  Pt has had 4 days of burning with urination   Urine was dark with a strong odor  She has urge to urinate   She is only urinating a small amount at a time.   She tried cranberry juice.   Denies vaginal discharge or itching.  Denies blood in urine or from vagina.     This am she woke up with abdominal pain yesterday afternoon around 3pm.   Pain is severe.  Pain in lower abdomen, feels like period cramps. Pain is constant.   Nausea but no vomiting. States she feels nauseated due to severe pain.  Diarrhea since yesterday. Stool is loose and yellow in color, she has had numerous episodes. Denies blood in stool.  Denies fever.   She has been taking advil for pain.  LMP beginning of   She has IUD but she does not know when it was placed.  Appetite decreased.  Energy is low.     DM: states she has not checked bg recently because she was more worried about her pain      Past Medical History:   Diagnosis Date   • Anxiety    • Bipolar disorder (HCC)    • Depression    • Diabetes (HCC)    • Diabetes mellitus type I (HCC)    • STD exposure         Past Surgical History:   Procedure Laterality Date   •  SECTION          Current Outpatient Medications on File Prior to Visit   Medication Sig Dispense Refill   • Accu-Chek FastClix Lancets misc 1 each 4 (Four) Times a Day. 102 each 11   • glucose blood test strip Test blood glucose 4 times a day and as needed 150 each 5   • Insulin Infusion Pump Supplies (MiniMed Ashvin Infusion Set) misc 10 each Every 3 (Three) Days. 30 each 0   • insulin lispro (HumaLOG) 100 UNIT/ML injection USE IN PUMP. MAXIMUM DAILY DOSE  UNITS DAILY 30 mL 1   • levonorgestrel (Mirena, 52 MG,) 20 MCG/24HR IUD        No current facility-administered medications on file prior to visit.        No Known Allergies    Social History     Tobacco Use   Smoking  "Status Never Smoker   Smokeless Tobacco Never Used          Objective   Vital Signs:   /60   Pulse 108   Temp 97.8 °F (36.6 °C)   Resp 15   Ht 172.7 cm (68\")   Wt 81.6 kg (180 lb)   SpO2 98%   BMI 27.37 kg/m²     Physical Exam  Vitals reviewed.   Constitutional:       Appearance: Normal appearance.   HENT:      Head: Normocephalic and atraumatic.      Nose: Nose normal.      Mouth/Throat:      Mouth: Mucous membranes are moist.   Eyes:      Extraocular Movements: Extraocular movements intact.      Conjunctiva/sclera: Conjunctivae normal.      Pupils: Pupils are equal, round, and reactive to light.   Cardiovascular:      Rate and Rhythm: Normal rate and regular rhythm.      Pulses: Normal pulses.      Heart sounds: Normal heart sounds.   Pulmonary:      Effort: Pulmonary effort is normal.      Breath sounds: Normal breath sounds.   Abdominal:      General: Abdomen is flat. Bowel sounds are increased.      Palpations: Abdomen is soft.      Tenderness: There is abdominal tenderness in the right lower quadrant and left lower quadrant. There is guarding. There is no right CVA tenderness, left CVA tenderness or rebound.   Musculoskeletal:         General: Normal range of motion.   Neurological:      General: No focal deficit present.      Mental Status: She is alert and oriented to person, place, and time.   Psychiatric:         Mood and Affect: Mood normal.        Result Review :                 Assessment and Plan    Diagnoses and all orders for this visit:    1. Type 1 diabetes mellitus without complication (HCC) (Primary)  Comments:  Discussed pt high risk for acute abdominal issues due to uncontrolled dm.    2. Generalized abdominal pain  Comments:  Discussed pt with DR. Callaway. Pt sent to ER via POV for acute abdomen. ER notified. UA WNL, no ketones but discussed concern for DKA vs other abd issues due to PE.    3. Nausea    4. Acute abdomen    5. Diarrhea, unspecified type  Comments:  Discussed possible " GI bug but need for CT scan, labs, and further workup at ER due to severe sx.        Follow Up   Return for Pt sent to ER via POV for additional workup. .  Patient was given instructions and counseling regarding her condition or for health maintenance advice. Please see specific information pulled into the AVS if appropriate.

## 2022-01-25 NOTE — ED PROVIDER NOTES
Subjective   Ely Burciaga is a 26 y.o. female who presents to the emergency department today with complaints of abdominal pain over the past three days. Pt states the pain is located in her lower abdomen and is a 7/10 in intensity. She complains of associated dysuria as well as nausea, emesis, chills and diarrhea. Prior to arrival pt was seen by her PCP who then referred her to this ED out of concern that her IUD may have shifted. She denies any known exposure to covid. She denies vaginal discharge, vaginal bleeding, urinary retention, hematuria, fever or any other pertinent sx.       History provided by:  Patient   used: No        Review of Systems   Constitutional: Positive for chills. Negative for fever.   HENT: Negative for congestion, rhinorrhea and sore throat.    Eyes: Negative for pain and visual disturbance.   Respiratory: Negative for apnea, cough, chest tightness and shortness of breath.    Cardiovascular: Negative for chest pain and palpitations.   Gastrointestinal: Positive for abdominal pain, diarrhea, nausea and vomiting.   Genitourinary: Positive for dysuria. Negative for difficulty urinating.   Musculoskeletal: Negative for joint swelling and myalgias.   Skin: Negative for color change.   Neurological: Negative for seizures and headaches.   Psychiatric/Behavioral: Negative.    All other systems reviewed and are negative.      Past Medical History:   Diagnosis Date   • Anxiety    • Bipolar disorder (HCC)    • Depression    • Diabetes (HCC)    • Diabetes mellitus type I (HCC)    • STD exposure        No Known Allergies    Past Surgical History:   Procedure Laterality Date   •  SECTION         Family History   Problem Relation Age of Onset   • Heart disease Other    • Prostate cancer Other        Social History     Socioeconomic History   • Marital status: Single   Tobacco Use   • Smoking status: Never Smoker   • Smokeless tobacco: Never Used   Vaping Use   • Vaping  Use: Every day   • Substances: Nicotine, Flavoring   • Devices: Disposable   Substance and Sexual Activity   • Alcohol use: Not Currently   • Drug use: Never     Comment: Denies substance abuse   • Sexual activity: Yes     Partners: Male         Objective   Physical Exam  Vitals and nursing note reviewed.   Constitutional:       General: She is not in acute distress.     Appearance: Normal appearance. She is not toxic-appearing.      Comments: Appears to be in moderate to severe pain   HENT:      Head: Normocephalic and atraumatic.      Jaw: There is normal jaw occlusion.   Eyes:      General: Lids are normal.      Extraocular Movements: Extraocular movements intact.      Conjunctiva/sclera: Conjunctivae normal.      Pupils: Pupils are equal, round, and reactive to light.   Cardiovascular:      Rate and Rhythm: Normal rate and regular rhythm.      Pulses: Normal pulses.      Heart sounds: Normal heart sounds.   Pulmonary:      Effort: Pulmonary effort is normal. No respiratory distress.      Breath sounds: Normal breath sounds. No wheezing or rhonchi.   Abdominal:      General: Abdomen is flat.      Palpations: Abdomen is soft.      Tenderness: There is abdominal tenderness (moderate) in the right lower quadrant, suprapubic area and left lower quadrant. There is no guarding or rebound.   Musculoskeletal:         General: Normal range of motion.      Cervical back: Normal range of motion and neck supple.      Right lower leg: No edema.      Left lower leg: No edema.   Skin:     General: Skin is warm and dry.   Neurological:      Mental Status: She is alert and oriented to person, place, and time. Mental status is at baseline.   Psychiatric:         Mood and Affect: Mood normal.         Procedures         ED Course     Labs Reviewed   COVID-19,APTIMA PANTHER (ADIEL)[facility] KURT/ OSBALDO, NP/OP SWAB IN UTM/VTM/SALINE TRANSPORT MEDIA,24 HR TAT - Abnormal; Notable for the following components:       Result Value    COVID19  Detected (*)     All other components within normal limits    Narrative:     Fact sheet for providers: https://www.fda.gov/media/182996/download     Fact sheet for patients: https://www.fda.gov/media/591811/download    Test performed by RT PCR.   COMPREHENSIVE METABOLIC PANEL - Abnormal; Notable for the following components:    Glucose 172 (*)     BUN/Creatinine Ratio 29.0 (*)     All other components within normal limits    Narrative:     GFR Normal >60  Chronic Kidney Disease <60  Kidney Failure <15     LIPASE - Abnormal; Notable for the following components:    Lipase 9 (*)     All other components within normal limits   URINALYSIS W/ MICROSCOPIC IF INDICATED (NO CULTURE) - Abnormal; Notable for the following components:    Specific Gravity, UA >1.030 (*)     Glucose, UA >=1000 mg/dL (3+) (*)     Ketones, UA Trace (*)     All other components within normal limits    Narrative:     Urine microscopic not indicated.   CBC WITH AUTO DIFFERENTIAL - Abnormal; Notable for the following components:    RDW 11.8 (*)     All other components within normal limits   COVID PRE-OP / PRE-PROCEDURE SCREENING ORDER (NO ISOLATION)    Narrative:     The following orders were created for panel order COVID PRE-OP / PRE-PROCEDURE SCREENING ORDER (NO ISOLATION) - Swab, Nasopharynx.  Procedure                               Abnormality         Status                     ---------                               -----------         ------                     COVID-19,APTIMA PANTHER(...[[id number]]  Abnormal            Final result                 Please view results for these tests on the individual orders.   RAINBOW DRAW    Narrative:     The following orders were created for panel order [place] Draw.  Procedure                               Abnormality         Status                     ---------                               -----------         ------                     Green Top (Gel)[[id number]]                                  Final  result               Lavender Top[[id number]]                                     Final result               Gold Top - SST[[id number]]                                   Final result               Light Blue Top[[id number]]                                   Final result                 Please view results for these tests on the individual orders.   HCG, QUANTITATIVE, PREGNANCY    Narrative:     HCG Ranges by Gestational Age    Females - non-pregnant premenopausal   </= 1mIU/mL HCG  Females - postmenopausal               </= 7mIU/mL HCG    3 Weeks       5.4   -      72 mIU/mL  4 Weeks      10.2   -     708 mIU/mL  5 Weeks       217   -   8,245 mIU/mL  6 Weeks       152   -  32,177 mIU/mL  7 Weeks     4,059   - 153,767 mIU/mL  8 Weeks    31,366   - 149,094 mIU/mL  9 Weeks    59,109   - 135,901 mIU/mL  10 Weeks   44,186   - 170,409 mIU/mL  12 Weeks   27,107   - 201,615 mIU/mL  14 Weeks   24,302   -  93,646 mIU/mL  15 Weeks   12,540   -  69,747 mIU/mL  16 Weeks    8,904   -  55,332 mIU/mL  17 Weeks    8,240   -  51,793 mIU/mL  18 Weeks    9,649   -  55,271 mIU/mL    Results may be falsely decreased if patient taking Biotin.     CBC AND DIFFERENTIAL    Narrative:     The following orders were created for panel order CBC & Differential.  Procedure                               Abnormality         Status                     ---------                               -----------         ------                     CBC Auto Differential[[id number]]        Abnormal            Final result                 Please view results for these tests on the individual orders.   GREEN TOP   LAVENDER TOP   GOLD TOP - SST   LIGHT BLUE TOP     CT Abdomen Pelvis With Contrast    Result Date: 1/25/2022  PROCEDURE: CT ABDOMEN PELVIS W CONTRAST  COMPARISON: [facility], CT, ABDOMEN/PELVIS WITH CONTRAST, 7/04/2013, 18:57.  INDICATIONS: Bilateral lower pelvic pain x 3 days, worsening x today, nausea/vomiting.  Hx IUD, hx csection.   TECHNIQUE: After obtaining the patient's consent, CT images were created with non-ionic intravenous contrast material.   PROTOCOL:   Standard imaging protocol performed    RADIATION:   DLP: 511.7mGy*cm   Automated exposure control was utilized to minimize radiation dose. CONTRAST: 100cc Isovue 370 I.V. LABS:   eGFR: >60ml/min/1.73m2  FINDINGS:  Lung bases clear.  The liver and gallbladder appear normal.  Spleen and pancreas appear normal.  No adrenal finding.  Small left renal cyst.  No obstructive uropathy.  Distal ureters are partly obscured due to lack of fat.  Uterus unremarkable.  IUD is in expected position.  Cystic lesion left adnexa measures 3.9 x 3.4 centimeters.  Trace ascites in the pelvis.  No inflammatory process seen in the colon.  No appendicitis.  Stomach is mildly distended with food.  There is no small bowel finding.  Normal-appearing aorta.  There is no adenopathy.  There is no acute osseous findings.        1. No renal or ureteral calculi.  No definite obstructive uropathy. 2. IUD in expected position. 3. 3.9 centimeters cystic lesion left adnexa.  This could be a functional cyst arising from left ovary.  Ultrasound could be considered. 4. No appendicitis     KAPIL MANRIQUEZ MD       Electronically Signed and Approved By: KAPIL MANRIQUEZ MD on 1/25/2022 at 11:49                                                    MDM    In my differential diagnosis of this patient with abdominal pain, I considered viral gastroenteritis, acute gastritis, GERD exacerbation with esophagitis, peptic ulcer disease, pancreatitis, cholecystitis, appendicitis.          This patient is a pleasant 26-year-old female presenting with nausea and vomiting and diarrhea and severe lower abdominal pains.    CT imaging actually looks fairly benign except there was a cyst on the left ovary.    All of her lab work was otherwise unremarkable and she is not pregnant and there is no kidney stone or appendicitis on scan.    I treated  her severe pain with IV pain meds and also hydrate her and give her antiemetics.    She ended up testing positive for COVID-19 after she was discharged.    I asked her to follow-up with her primary care physician for symptoms and I called in a small amount of pain and nausea meds for symptom relief and give her supportive care instructions.    Final diagnoses:   Abdominal pain, acute, bilateral lower quadrant   Nausea, vomiting, and diarrhea   Left ovarian cyst   COVID-19       Documentation assistance provided by rossi Han.  Information recorded by the scrjae was done at my direction and has been verified and validated by me.     Esthela Han  01/25/22 2750       Adriano Vasquez MD  01/25/22 7641

## 2022-01-25 NOTE — DISCHARGE INSTRUCTIONS
All of your blood work looked okay today and your blood sugar was 172.    No pregnancy was seen, and your IUD looked to be in proper position in the uterus on the CT scan today.    No appendicitis or any bowel blockages or perforations were found, but you do have a moderately large 4 cm left ovarian cyst that could be causing some pain in the lower abdomen.    Take ibuprofen and use hydrocodone as needed for pain control and please call your OB/GYN for a follow-up appointment to be seen.

## 2022-01-31 ENCOUNTER — HOSPITAL ENCOUNTER (EMERGENCY)
Facility: HOSPITAL | Age: 27
Discharge: HOME | End: 2022-01-31
Attending: EMERGENCY MEDICINE | Admitting: EMERGENCY MEDICINE

## 2022-01-31 ENCOUNTER — APPOINTMENT (OUTPATIENT)
Dept: ULTRASOUND IMAGING | Facility: HOSPITAL | Age: 27
End: 2022-01-31

## 2022-01-31 VITALS
HEART RATE: 88 BPM | DIASTOLIC BLOOD PRESSURE: 65 MMHG | WEIGHT: 174.82 LBS | BODY MASS INDEX: 26.5 KG/M2 | OXYGEN SATURATION: 95 % | RESPIRATION RATE: 18 BRPM | TEMPERATURE: 98 F | SYSTOLIC BLOOD PRESSURE: 103 MMHG | HEIGHT: 68 IN

## 2022-01-31 DIAGNOSIS — N83.209 HEMORRHAGIC CYST OF OVARY: ICD-10-CM

## 2022-01-31 DIAGNOSIS — R10.31 ABDOMINAL PAIN, ACUTE, BILATERAL LOWER QUADRANT: ICD-10-CM

## 2022-01-31 DIAGNOSIS — N83.202 LEFT OVARIAN CYST: ICD-10-CM

## 2022-01-31 DIAGNOSIS — R73.9 HYPERGLYCEMIA: Primary | ICD-10-CM

## 2022-01-31 DIAGNOSIS — R10.32 ABDOMINAL PAIN, ACUTE, BILATERAL LOWER QUADRANT: ICD-10-CM

## 2022-01-31 LAB
ACETONE BLD QL: NEGATIVE
ALBUMIN SERPL-MCNC: 4.5 G/DL (ref 3.5–5.2)
ALBUMIN/GLOB SERPL: 1.6 G/DL
ALP SERPL-CCNC: 104 U/L (ref 39–117)
ALT SERPL W P-5'-P-CCNC: 17 U/L (ref 1–33)
ANION GAP SERPL CALCULATED.3IONS-SCNC: 12.3 MMOL/L (ref 5–15)
AST SERPL-CCNC: 17 U/L (ref 1–32)
BASE EXCESS BLDV CALC-SCNC: -1.4 MMOL/L (ref -2–2)
BASOPHILS # BLD AUTO: 0.07 10*3/MM3 (ref 0–0.2)
BASOPHILS NFR BLD AUTO: 0.8 % (ref 0–1.5)
BDY SITE: ABNORMAL
BILIRUB SERPL-MCNC: 0.5 MG/DL (ref 0–1.2)
BILIRUB UR QL STRIP: NEGATIVE
BUN SERPL-MCNC: 19 MG/DL (ref 6–20)
BUN/CREAT SERPL: 24.1 (ref 7–25)
CALCIUM SPEC-SCNC: 9.7 MG/DL (ref 8.6–10.5)
CHLORIDE SERPL-SCNC: 96 MMOL/L (ref 98–107)
CLARITY UR: CLEAR
CO2 SERPL-SCNC: 21.7 MMOL/L (ref 22–29)
COHGB MFR BLD: 1.6 % (ref 0–1.5)
COLOR UR: YELLOW
CREAT SERPL-MCNC: 0.79 MG/DL (ref 0.57–1)
DEPRECATED RDW RBC AUTO: 35 FL (ref 37–54)
EOSINOPHIL # BLD AUTO: 0.04 10*3/MM3 (ref 0–0.4)
EOSINOPHIL NFR BLD AUTO: 0.5 % (ref 0.3–6.2)
ERYTHROCYTE [DISTWIDTH] IN BLOOD BY AUTOMATED COUNT: 11.4 % (ref 12.3–15.4)
FHHB: 14.5 % (ref 0–5)
GFR SERPL CREATININE-BSD FRML MDRD: 87 ML/MIN/1.73
GLOBULIN UR ELPH-MCNC: 2.8 GM/DL
GLUCOSE BLDC GLUCOMTR-MCNC: 271 MG/DL (ref 70–99)
GLUCOSE BLDC GLUCOMTR-MCNC: 337 MG/DL (ref 70–99)
GLUCOSE SERPL-MCNC: 641 MG/DL (ref 65–99)
GLUCOSE UR STRIP-MCNC: ABNORMAL MG/DL
HCG INTACT+B SERPL-ACNC: <0.5 MIU/ML
HCO3 BLDV-SCNC: 22.8 MMOL/L (ref 22–26)
HCT VFR BLD AUTO: 39.3 % (ref 34–46.6)
HGB BLD-MCNC: 14.2 G/DL (ref 12–15.9)
HGB BLDA-MCNC: 14.5 G/DL (ref 11.7–14.6)
HGB UR QL STRIP.AUTO: NEGATIVE
HOLD SPECIMEN: NORMAL
HOLD SPECIMEN: NORMAL
IMM GRANULOCYTES # BLD AUTO: 0.04 10*3/MM3 (ref 0–0.05)
IMM GRANULOCYTES NFR BLD AUTO: 0.5 % (ref 0–0.5)
INHALED O2 CONCENTRATION: 21 %
KETONES UR QL STRIP: ABNORMAL
LEUKOCYTE ESTERASE UR QL STRIP.AUTO: NEGATIVE
LIPASE SERPL-CCNC: 11 U/L (ref 13–60)
LYMPHOCYTES # BLD AUTO: 1.39 10*3/MM3 (ref 0.7–3.1)
LYMPHOCYTES NFR BLD AUTO: 16.7 % (ref 19.6–45.3)
MCH RBC QN AUTO: 30.6 PG (ref 26.6–33)
MCHC RBC AUTO-ENTMCNC: 36.1 G/DL (ref 31.5–35.7)
MCV RBC AUTO: 84.7 FL (ref 79–97)
METHGB BLD QL: 0.2 % (ref 0–1.5)
MODALITY: ABNORMAL
MONOCYTES # BLD AUTO: 0.52 10*3/MM3 (ref 0.1–0.9)
MONOCYTES NFR BLD AUTO: 6.2 % (ref 5–12)
NEUTROPHILS NFR BLD AUTO: 6.27 10*3/MM3 (ref 1.7–7)
NEUTROPHILS NFR BLD AUTO: 75.3 % (ref 42.7–76)
NITRITE UR QL STRIP: NEGATIVE
NOTE: ABNORMAL
NRBC BLD AUTO-RTO: 0 /100 WBC (ref 0–0.2)
OXYHGB MFR BLDV: 83.7 % (ref 94–99)
PCO2 BLDV: 36.7 MM HG (ref 41–51)
PH BLDV: 7.41 PH UNITS (ref 7.31–7.41)
PH UR STRIP.AUTO: <=5 [PH] (ref 5–8)
PLATELET # BLD AUTO: 315 10*3/MM3 (ref 140–450)
PMV BLD AUTO: 9.6 FL (ref 6–12)
PO2 BLDV: 46.7 MM HG (ref 35–42)
POTASSIUM SERPL-SCNC: 4.8 MMOL/L (ref 3.5–5.2)
PROT SERPL-MCNC: 7.3 G/DL (ref 6–8.5)
PROT UR QL STRIP: NEGATIVE
RBC # BLD AUTO: 4.64 10*6/MM3 (ref 3.77–5.28)
SAO2 % BLDCOV: 85.2 % (ref 95–99)
SODIUM SERPL-SCNC: 130 MMOL/L (ref 136–145)
SP GR UR STRIP: >1.03 (ref 1–1.03)
UROBILINOGEN UR QL STRIP: ABNORMAL
WBC NRBC COR # BLD: 8.33 10*3/MM3 (ref 3.4–10.8)
WHOLE BLOOD HOLD SPECIMEN: NORMAL
WHOLE BLOOD HOLD SPECIMEN: NORMAL

## 2022-01-31 PROCEDURE — 36415 COLL VENOUS BLD VENIPUNCTURE: CPT

## 2022-01-31 PROCEDURE — 80053 COMPREHEN METABOLIC PANEL: CPT

## 2022-01-31 PROCEDURE — 84702 CHORIONIC GONADOTROPIN TEST: CPT

## 2022-01-31 PROCEDURE — 96376 TX/PRO/DX INJ SAME DRUG ADON: CPT

## 2022-01-31 PROCEDURE — 96374 THER/PROPH/DIAG INJ IV PUSH: CPT

## 2022-01-31 PROCEDURE — 85025 COMPLETE CBC W/AUTO DIFF WBC: CPT

## 2022-01-31 PROCEDURE — 82805 BLOOD GASES W/O2 SATURATION: CPT | Performed by: NURSE PRACTITIONER

## 2022-01-31 PROCEDURE — 81003 URINALYSIS AUTO W/O SCOPE: CPT

## 2022-01-31 PROCEDURE — 76830 TRANSVAGINAL US NON-OB: CPT

## 2022-01-31 PROCEDURE — 82009 KETONE BODYS QUAL: CPT | Performed by: NURSE PRACTITIONER

## 2022-01-31 PROCEDURE — 99283 EMERGENCY DEPT VISIT LOW MDM: CPT

## 2022-01-31 PROCEDURE — 83690 ASSAY OF LIPASE: CPT

## 2022-01-31 PROCEDURE — 82962 GLUCOSE BLOOD TEST: CPT

## 2022-01-31 PROCEDURE — 82820 HEMOGLOBIN-OXYGEN AFFINITY: CPT | Performed by: NURSE PRACTITIONER

## 2022-01-31 PROCEDURE — 25010000002 HYDROMORPHONE 1 MG/ML SOLUTION: Performed by: EMERGENCY MEDICINE

## 2022-01-31 RX ORDER — HYDROCODONE BITARTRATE AND ACETAMINOPHEN 7.5; 325 MG/1; MG/1
1 TABLET ORAL EVERY 6 HOURS PRN
Qty: 9 TABLET | Refills: 0 | Status: SHIPPED | OUTPATIENT
Start: 2022-01-31 | End: 2022-06-06

## 2022-01-31 RX ORDER — SODIUM CHLORIDE 0.9 % (FLUSH) 0.9 %
10 SYRINGE (ML) INJECTION AS NEEDED
Status: DISCONTINUED | OUTPATIENT
Start: 2022-01-31 | End: 2022-01-31 | Stop reason: HOSPADM

## 2022-01-31 RX ORDER — KETOROLAC TROMETHAMINE 30 MG/ML
30 INJECTION, SOLUTION INTRAMUSCULAR; INTRAVENOUS ONCE
Status: DISCONTINUED | OUTPATIENT
Start: 2022-01-31 | End: 2022-01-31 | Stop reason: HOSPADM

## 2022-01-31 RX ORDER — KETOROLAC TROMETHAMINE 10 MG/1
10 TABLET, FILM COATED ORAL EVERY 6 HOURS PRN
Qty: 12 TABLET | Refills: 0 | Status: SHIPPED | OUTPATIENT
Start: 2022-01-31 | End: 2022-06-06

## 2022-01-31 RX ORDER — PHENAZOPYRIDINE HYDROCHLORIDE 200 MG/1
200 TABLET, FILM COATED ORAL 3 TIMES DAILY PRN
Qty: 12 TABLET | Refills: 0 | Status: SHIPPED | OUTPATIENT
Start: 2022-01-31 | End: 2022-06-06

## 2022-01-31 RX ADMIN — SODIUM CHLORIDE 1000 ML: 9 INJECTION, SOLUTION INTRAVENOUS at 19:15

## 2022-01-31 RX ADMIN — HYDROMORPHONE HYDROCHLORIDE 1 MG: 1 INJECTION, SOLUTION INTRAMUSCULAR; INTRAVENOUS; SUBCUTANEOUS at 20:08

## 2022-01-31 RX ADMIN — SODIUM CHLORIDE 1000 ML: 9 INJECTION, SOLUTION INTRAVENOUS at 18:20

## 2022-01-31 RX ADMIN — HYDROMORPHONE HYDROCHLORIDE 1 MG: 1 INJECTION, SOLUTION INTRAMUSCULAR; INTRAVENOUS; SUBCUTANEOUS at 18:31

## 2022-02-01 NOTE — DISCHARGE INSTRUCTIONS
Follow up with your OB/GYN for further evaluation and repeat ultrasound. Monitor your blood glucose and treat as directed/prescribed.

## 2022-02-01 NOTE — ED PROVIDER NOTES
"Patient is 26 y.o. year old female that presents to the ED for evaluation of abdominal pain.     Physical Exam    ED Course:    /84 (BP Location: Left arm, Patient Position: Sitting)   Pulse 85   Temp 98 °F (36.7 °C) (Oral)   Resp 18   Ht 172.7 cm (68\")   Wt 79.3 kg (174 lb 13.2 oz)   SpO2 97%   BMI 26.58 kg/m²   Results for orders placed or performed during the hospital encounter of 01/31/22   Comprehensive Metabolic Panel    Specimen: Blood   Result Value Ref Range    Glucose 641 (C) 65 - 99 mg/dL    BUN 19 6 - 20 mg/dL    Creatinine 0.79 0.57 - 1.00 mg/dL    Sodium 130 (L) 136 - 145 mmol/L    Potassium 4.8 3.5 - 5.2 mmol/L    Chloride 96 (L) 98 - 107 mmol/L    CO2 21.7 (L) 22.0 - 29.0 mmol/L    Calcium 9.7 8.6 - 10.5 mg/dL    Total Protein 7.3 6.0 - 8.5 g/dL    Albumin 4.50 3.50 - 5.20 g/dL    ALT (SGPT) 17 1 - 33 U/L    AST (SGOT) 17 1 - 32 U/L    Alkaline Phosphatase 104 39 - 117 U/L    Total Bilirubin 0.5 0.0 - 1.2 mg/dL    eGFR Non African Amer 87 >60 mL/min/1.73    Globulin 2.8 gm/dL    A/G Ratio 1.6 g/dL    BUN/Creatinine Ratio 24.1 7.0 - 25.0    Anion Gap 12.3 5.0 - 15.0 mmol/L   Lipase    Specimen: Blood   Result Value Ref Range    Lipase 11 (L) 13 - 60 U/L   Urinalysis With Microscopic If Indicated (No Culture) - Urine, Clean Catch    Specimen: Urine, Clean Catch   Result Value Ref Range    Color, UA Yellow Yellow, Straw    Appearance, UA Clear Clear    pH, UA <=5.0 5.0 - 8.0    Specific Gravity, UA >1.030 (H) 1.005 - 1.030    Glucose, UA >=1000 mg/dL (3+) (A) Negative    Ketones, UA Trace (A) Negative    Bilirubin, UA Negative Negative    Blood, UA Negative Negative    Protein, UA Negative Negative    Leuk Esterase, UA Negative Negative    Nitrite, UA Negative Negative    Urobilinogen, UA 0.2 E.U./dL 0.2 - 1.0 E.U./dL   hCG, Quantitative, Pregnancy    Specimen: Blood   Result Value Ref Range    HCG Quantitative <0.50 mIU/mL   CBC Auto Differential    Specimen: Blood   Result Value Ref Range "    WBC 8.33 3.40 - 10.80 10*3/mm3    RBC 4.64 3.77 - 5.28 10*6/mm3    Hemoglobin 14.2 12.0 - 15.9 g/dL    Hematocrit 39.3 34.0 - 46.6 %    MCV 84.7 79.0 - 97.0 fL    MCH 30.6 26.6 - 33.0 pg    MCHC 36.1 (H) 31.5 - 35.7 g/dL    RDW 11.4 (L) 12.3 - 15.4 %    RDW-SD 35.0 (L) 37.0 - 54.0 fl    MPV 9.6 6.0 - 12.0 fL    Platelets 315 140 - 450 10*3/mm3    Neutrophil % 75.3 42.7 - 76.0 %    Lymphocyte % 16.7 (L) 19.6 - 45.3 %    Monocyte % 6.2 5.0 - 12.0 %    Eosinophil % 0.5 0.3 - 6.2 %    Basophil % 0.8 0.0 - 1.5 %    Immature Grans % 0.5 0.0 - 0.5 %    Neutrophils, Absolute 6.27 1.70 - 7.00 10*3/mm3    Lymphocytes, Absolute 1.39 0.70 - 3.10 10*3/mm3    Monocytes, Absolute 0.52 0.10 - 0.90 10*3/mm3    Eosinophils, Absolute 0.04 0.00 - 0.40 10*3/mm3    Basophils, Absolute 0.07 0.00 - 0.20 10*3/mm3    Immature Grans, Absolute 0.04 0.00 - 0.05 10*3/mm3    nRBC 0.0 0.0 - 0.2 /100 WBC   Acetone    Specimen: Blood   Result Value Ref Range    Acetone Negative Negative   Blood Gas, Venous -With Co-Ox Panel: Yes    Specimen: Venous Blood   Result Value Ref Range    pH, Venous 7.411 (H) 7.310 - 7.410 pH Units    pCO2, Venous 36.7 (L) 41.0 - 51.0 mm Hg    pO2, Venous 46.7 (H) 35.0 - 42.0 mm Hg    HCO3, Venous 22.8 22.0 - 26.0 mmol/L    Base Excess, Venous -1.4 -2.0 - 2.0 mmol/L    O2 Saturation, Venous 85.2 (L) 95.0 - 99.0 %    Hemoglobin, Blood Gas 14.5 11.7 - 14.6 g/dL    Carboxyhemoglobin 1.6 (H) 0 - 1.5 %    Methemoglobin 0.20 0.00 - 1.50 %    Oxyhemoglobin 83.7 (L) 94 - 99 %    FHHB 14.5 (H) 0.0 - 5.0 %    Note      Site Drawn by RN     Modality Room Air     FIO2 21 %   POC Glucose Once    Specimen: Blood   Result Value Ref Range    Glucose 337 (H) 70 - 99 mg/dL   POC Glucose Once    Specimen: Blood   Result Value Ref Range    Glucose 271 (H) 70 - 99 mg/dL   Green Top (Gel)   Result Value Ref Range    Extra Tube Hold for add-ons.    Lavender Top   Result Value Ref Range    Extra Tube hold for add-on    Gold Top - SST   Result  Value Ref Range    Extra Tube Hold for add-ons.    Light Blue Top   Result Value Ref Range    Extra Tube hold for add-on      Medications   sodium chloride 0.9 % flush 10 mL (has no administration in time range)   ketorolac (TORADOL) injection 30 mg (30 mg Intravenous Not Given 1/31/22 1821)   sodium chloride 0.9 % bolus 1,000 mL (0 mL Intravenous Stopped 1/31/22 1911)   HYDROmorphone (DILAUDID) injection 1 mg (1 mg Intravenous Given 1/31/22 1831)   sodium chloride 0.9 % bolus 1,000 mL (0 mL Intravenous Stopped 1/31/22 2004)   HYDROmorphone (DILAUDID) injection 1 mg (1 mg Intravenous Given 1/31/22 2008)     US Non-ob Transvaginal    Result Date: 1/31/2022  Narrative: PROCEDURE: US NON-OB TRANSVAGINAL  COMPARISON: [facility], CT, CT ABDOMEN PELVIS W CONTRAST, 1/25/2022, 11:25.  INDICATIONS: worsening pelvic pain  TECHNIQUE: Ultrasound examination of the pelvis was performed, using endovaginal technique.   FINDINGS:  UTERUS: Size is 8.4 cm x 5.7 cm x 4 cm with unremarkable appearance.  Endometrial thickness is 3 mm.  ADNEXAE: The right ovary measures 2.7 cm x 2 cm x 1.6 cm.  The left ovary measures 5 cm x 3.4 cm x 3 cm.  There is a 3.5 cm complex cystic mass in the left ovary.  Flow is present in the ovaries on Doppler imaging.  CUL-DE-SAC: Normal.  No fluid or mass.  OTHER: IUD in good position.       Impression:  3.5 cm complex cystic mass in the left ovary likely a hemorrhagic cyst.  Recommend a follow-up pelvic ultrasound in 6 weeks.     RAINER BELTRAN MD       Electronically Signed and Approved By: RAINER BELTRAN MD on 1/31/2022 at 17:42             CT Abdomen Pelvis With Contrast    Result Date: 1/25/2022  Narrative: PROCEDURE: CT ABDOMEN PELVIS W CONTRAST  COMPARISON: [facility], CT, ABDOMEN/PELVIS WITH CONTRAST, 7/04/2013, 18:57.  INDICATIONS: Bilateral lower pelvic pain x 3 days, worsening x today, nausea/vomiting.  Hx IUD, hx csection.  TECHNIQUE: After obtaining the patient's  consent, CT images were created with non-ionic intravenous contrast material.   PROTOCOL:   Standard imaging protocol performed    RADIATION:   DLP: 511.7mGy*cm   Automated exposure control was utilized to minimize radiation dose. CONTRAST: 100cc Isovue 370 I.V. LABS:   eGFR: >60ml/min/1.73m2  FINDINGS:  Lung bases clear.  The liver and gallbladder appear normal.  Spleen and pancreas appear normal.  No adrenal finding.  Small left renal cyst.  No obstructive uropathy.  Distal ureters are partly obscured due to lack of fat.  Uterus unremarkable.  IUD is in expected position.  Cystic lesion left adnexa measures 3.9 x 3.4 centimeters.  Trace ascites in the pelvis.  No inflammatory process seen in the colon.  No appendicitis.  Stomach is mildly distended with food.  There is no small bowel finding.  Normal-appearing aorta.  There is no adenopathy.  There is no acute osseous findings.      Impression:   1. No renal or ureteral calculi.  No definite obstructive uropathy. 2. IUD in expected position. 3. 3.9 centimeters cystic lesion left adnexa.  This could be a functional cyst arising from left ovary.  Ultrasound could be considered. 4. No appendicitis     KAPIL MANRIQUEZ MD       Electronically Signed and Approved By: KAPIL MANRIQUEZ MD on 1/25/2022 at 11:49               MDM:    Procedures      The case was discussed between the NAFISA and myself. Patient  care including, but not limited to ordered imaging, medications, and lab results were reviewed. I then performed the substantive portion of the visit including all aspects of the medical decision making.        Harpal Irvin DO  20:52 EST  01/31/22       Harpal Irvin DO  01/31/22 2052

## 2022-03-28 ENCOUNTER — TELEPHONE (OUTPATIENT)
Dept: DIABETES SERVICES | Facility: HOSPITAL | Age: 27
End: 2022-03-28

## 2022-04-04 PROCEDURE — 87480 CANDIDA DNA DIR PROBE: CPT | Performed by: NURSE PRACTITIONER

## 2022-04-04 PROCEDURE — 87491 CHLMYD TRACH DNA AMP PROBE: CPT | Performed by: NURSE PRACTITIONER

## 2022-04-04 PROCEDURE — G0432 EIA HIV-1/HIV-2 SCREEN: HCPCS | Performed by: NURSE PRACTITIONER

## 2022-04-04 PROCEDURE — 86696 HERPES SIMPLEX TYPE 2 TEST: CPT | Performed by: NURSE PRACTITIONER

## 2022-04-04 PROCEDURE — 86695 HERPES SIMPLEX TYPE 1 TEST: CPT | Performed by: NURSE PRACTITIONER

## 2022-04-04 PROCEDURE — 87660 TRICHOMONAS VAGIN DIR PROBE: CPT | Performed by: NURSE PRACTITIONER

## 2022-04-04 PROCEDURE — 86592 SYPHILIS TEST NON-TREP QUAL: CPT | Performed by: NURSE PRACTITIONER

## 2022-04-04 PROCEDURE — 87510 GARDNER VAG DNA DIR PROBE: CPT | Performed by: NURSE PRACTITIONER

## 2022-04-04 PROCEDURE — 87591 N.GONORRHOEAE DNA AMP PROB: CPT | Performed by: NURSE PRACTITIONER

## 2022-04-05 ENCOUNTER — TELEPHONE (OUTPATIENT)
Dept: URGENT CARE | Facility: CLINIC | Age: 27
End: 2022-04-05

## 2022-04-05 DIAGNOSIS — B96.89 BACTERIAL VAGINOSIS: Primary | ICD-10-CM

## 2022-04-05 DIAGNOSIS — N76.0 BACTERIAL VAGINOSIS: Primary | ICD-10-CM

## 2022-04-05 RX ORDER — METRONIDAZOLE 500 MG/1
500 TABLET ORAL 2 TIMES DAILY
Qty: 14 TABLET | Refills: 0 | Status: SHIPPED | OUTPATIENT
Start: 2022-04-05 | End: 2022-04-12

## 2022-04-05 NOTE — TELEPHONE ENCOUNTER
Patient notified of positive bacterial vaginosis. Metronidazole 500mg BID x 7 day sent to [facility]. Advised to avoid alcohol while on abx and for 2 days after completing. Patient verbalized understanding.

## 2022-04-06 ENCOUNTER — TELEPHONE (OUTPATIENT)
Dept: URGENT CARE | Facility: CLINIC | Age: 27
End: 2022-04-06

## 2022-04-06 NOTE — TELEPHONE ENCOUNTER
Left message for patient to return call to clinic to discuss test results. 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 According to visit note, she had no symptoms at time of visit and therefore no treatment is indicated at this time. Advise her to follow up with primary care provider if symptoms develop.

## 2022-04-07 ENCOUNTER — TELEPHONE (OUTPATIENT)
Dept: URGENT CARE | Facility: CLINIC | Age: 27
End: 2022-04-07

## 2022-04-07 NOTE — TELEPHONE ENCOUNTER
----- Message from Ana Leiva PA-C sent at 4/6/2022  9:45 AM EDT -----  Please call the patient regarding her abnormal result. See telephone encounter.

## 2022-04-08 ENCOUNTER — TELEPHONE (OUTPATIENT)
Dept: URGENT CARE | Facility: CLINIC | Age: 27
End: 2022-04-08

## 2022-06-06 ENCOUNTER — OFFICE VISIT (OUTPATIENT)
Dept: INTERNAL MEDICINE | Facility: CLINIC | Age: 27
End: 2022-06-06

## 2022-06-06 ENCOUNTER — HOSPITAL ENCOUNTER (EMERGENCY)
Facility: HOSPITAL | Age: 27
Discharge: HOME | End: 2022-06-06
Attending: EMERGENCY MEDICINE | Admitting: EMERGENCY MEDICINE

## 2022-06-06 ENCOUNTER — TELEPHONE (OUTPATIENT)
Dept: INTERNAL MEDICINE | Facility: CLINIC | Age: 27
End: 2022-06-06

## 2022-06-06 VITALS
RESPIRATION RATE: 17 BRPM | SYSTOLIC BLOOD PRESSURE: 92 MMHG | BODY MASS INDEX: 25.24 KG/M2 | WEIGHT: 166 LBS | TEMPERATURE: 98.2 F | OXYGEN SATURATION: 100 % | HEART RATE: 88 BPM | DIASTOLIC BLOOD PRESSURE: 62 MMHG

## 2022-06-06 VITALS
OXYGEN SATURATION: 98 % | SYSTOLIC BLOOD PRESSURE: 111 MMHG | BODY MASS INDEX: 24.99 KG/M2 | HEIGHT: 68 IN | DIASTOLIC BLOOD PRESSURE: 73 MMHG | WEIGHT: 164.9 LBS | RESPIRATION RATE: 21 BRPM | HEART RATE: 68 BPM | TEMPERATURE: 98 F

## 2022-06-06 DIAGNOSIS — R82.4 KETONURIA: ICD-10-CM

## 2022-06-06 DIAGNOSIS — E86.0 DEHYDRATION: Primary | ICD-10-CM

## 2022-06-06 DIAGNOSIS — I95.89 HYPOTENSION DUE TO HYPOVOLEMIA: ICD-10-CM

## 2022-06-06 DIAGNOSIS — E10.9 TYPE 1 DIABETES MELLITUS WITHOUT COMPLICATION: Primary | ICD-10-CM

## 2022-06-06 DIAGNOSIS — E86.1 HYPOTENSION DUE TO HYPOVOLEMIA: ICD-10-CM

## 2022-06-06 LAB
ACETONE BLD QL: NEGATIVE
ALBUMIN SERPL-MCNC: 4.7 G/DL (ref 3.5–5.2)
ALBUMIN/GLOB SERPL: 1.7 G/DL
ALP SERPL-CCNC: 102 U/L (ref 39–117)
ALT SERPL W P-5'-P-CCNC: 18 U/L (ref 1–33)
AMORPH URATE CRY URNS QL MICRO: ABNORMAL /HPF
ANION GAP SERPL CALCULATED.3IONS-SCNC: 11.6 MMOL/L (ref 5–15)
AST SERPL-CCNC: 18 U/L (ref 1–32)
BACTERIA UR QL AUTO: ABNORMAL /HPF
BACTERIA UR QL AUTO: ABNORMAL /HPF
BASOPHILS # BLD AUTO: 0.05 10*3/MM3 (ref 0–0.2)
BASOPHILS NFR BLD AUTO: 0.8 % (ref 0–1.5)
BILIRUB SERPL-MCNC: 0.6 MG/DL (ref 0–1.2)
BILIRUB UR QL STRIP: ABNORMAL
BILIRUB UR QL STRIP: ABNORMAL
BUN SERPL-MCNC: 12 MG/DL (ref 6–20)
BUN/CREAT SERPL: 19.4 (ref 7–25)
CALCIUM SPEC-SCNC: 9.7 MG/DL (ref 8.6–10.5)
CHLORIDE SERPL-SCNC: 103 MMOL/L (ref 98–107)
CLARITY UR: ABNORMAL
CLARITY UR: CLEAR
CO2 SERPL-SCNC: 24.4 MMOL/L (ref 22–29)
COLOR UR: ABNORMAL
COLOR UR: ABNORMAL
CREAT SERPL-MCNC: 0.62 MG/DL (ref 0.57–1)
DEPRECATED RDW RBC AUTO: 35.5 FL (ref 37–54)
EGFRCR SERPLBLD CKD-EPI 2021: 124.6 ML/MIN/1.73
EOSINOPHIL # BLD AUTO: 0.1 10*3/MM3 (ref 0–0.4)
EOSINOPHIL NFR BLD AUTO: 1.7 % (ref 0.3–6.2)
ERYTHROCYTE [DISTWIDTH] IN BLOOD BY AUTOMATED COUNT: 11.4 % (ref 12.3–15.4)
GLOBULIN UR ELPH-MCNC: 2.8 GM/DL
GLUCOSE BLDC GLUCOMTR-MCNC: 118 MG/DL (ref 70–99)
GLUCOSE BLDC GLUCOMTR-MCNC: 143 MG/DL (ref 70–130)
GLUCOSE BLDC GLUCOMTR-MCNC: 160 MG/DL (ref 70–99)
GLUCOSE SERPL-MCNC: 97 MG/DL (ref 65–99)
GLUCOSE UR STRIP-MCNC: NEGATIVE MG/DL
GLUCOSE UR STRIP-MCNC: NEGATIVE MG/DL
HCT VFR BLD AUTO: 41.4 % (ref 34–46.6)
HGB BLD-MCNC: 14.5 G/DL (ref 12–15.9)
HGB UR QL STRIP.AUTO: NEGATIVE
HGB UR QL STRIP.AUTO: NEGATIVE
HOLD SPECIMEN: NORMAL
HOLD SPECIMEN: NORMAL
HYALINE CASTS UR QL AUTO: ABNORMAL /LPF
HYALINE CASTS UR QL AUTO: ABNORMAL /LPF
IMM GRANULOCYTES # BLD AUTO: 0.01 10*3/MM3 (ref 0–0.05)
IMM GRANULOCYTES NFR BLD AUTO: 0.2 % (ref 0–0.5)
KETONES UR QL STRIP: ABNORMAL
KETONES UR QL STRIP: ABNORMAL
LEUKOCYTE ESTERASE UR QL STRIP.AUTO: ABNORMAL
LEUKOCYTE ESTERASE UR QL STRIP.AUTO: NEGATIVE
LYMPHOCYTES # BLD AUTO: 2.75 10*3/MM3 (ref 0.7–3.1)
LYMPHOCYTES NFR BLD AUTO: 45.9 % (ref 19.6–45.3)
MCH RBC QN AUTO: 30 PG (ref 26.6–33)
MCHC RBC AUTO-ENTMCNC: 35 G/DL (ref 31.5–35.7)
MCV RBC AUTO: 85.5 FL (ref 79–97)
MONOCYTES # BLD AUTO: 0.46 10*3/MM3 (ref 0.1–0.9)
MONOCYTES NFR BLD AUTO: 7.7 % (ref 5–12)
MUCOUS THREADS URNS QL MICRO: ABNORMAL /HPF
NEUTROPHILS NFR BLD AUTO: 2.62 10*3/MM3 (ref 1.7–7)
NEUTROPHILS NFR BLD AUTO: 43.7 % (ref 42.7–76)
NITRITE UR QL STRIP: NEGATIVE
NITRITE UR QL STRIP: NEGATIVE
NRBC BLD AUTO-RTO: 0 /100 WBC (ref 0–0.2)
PH BLDV: 7.38 PH UNITS (ref 7.31–7.41)
PH UR STRIP.AUTO: 5.5 [PH] (ref 5–8)
PH UR STRIP.AUTO: <=5 [PH] (ref 5–8)
PLATELET # BLD AUTO: 415 10*3/MM3 (ref 140–450)
PMV BLD AUTO: 9.9 FL (ref 6–12)
POTASSIUM SERPL-SCNC: 3.5 MMOL/L (ref 3.5–5.2)
PROT SERPL-MCNC: 7.5 G/DL (ref 6–8.5)
PROT UR QL STRIP: ABNORMAL
PROT UR QL STRIP: ABNORMAL
RBC # BLD AUTO: 4.84 10*6/MM3 (ref 3.77–5.28)
RBC # UR STRIP: ABNORMAL /HPF
RBC # UR STRIP: ABNORMAL /HPF
REF LAB TEST METHOD: ABNORMAL
REF LAB TEST METHOD: ABNORMAL
SODIUM SERPL-SCNC: 139 MMOL/L (ref 136–145)
SP GR UR STRIP: 1.03 (ref 1–1.03)
SP GR UR STRIP: >=1.03 (ref 1–1.03)
SQUAMOUS #/AREA URNS HPF: ABNORMAL /HPF
SQUAMOUS #/AREA URNS HPF: ABNORMAL /HPF
UROBILINOGEN UR QL STRIP: ABNORMAL
UROBILINOGEN UR QL STRIP: ABNORMAL
WBC # UR STRIP: ABNORMAL /HPF
WBC # UR STRIP: ABNORMAL /HPF
WBC NRBC COR # BLD: 5.99 10*3/MM3 (ref 3.4–10.8)
WHOLE BLOOD HOLD COAG: NORMAL
WHOLE BLOOD HOLD SPECIMEN: NORMAL

## 2022-06-06 PROCEDURE — 99283 EMERGENCY DEPT VISIT LOW MDM: CPT

## 2022-06-06 PROCEDURE — 82962 GLUCOSE BLOOD TEST: CPT

## 2022-06-06 PROCEDURE — 80053 COMPREHEN METABOLIC PANEL: CPT

## 2022-06-06 PROCEDURE — 81001 URINALYSIS AUTO W/SCOPE: CPT | Performed by: EMERGENCY MEDICINE

## 2022-06-06 PROCEDURE — 99214 OFFICE O/P EST MOD 30 MIN: CPT | Performed by: PHYSICIAN ASSISTANT

## 2022-06-06 PROCEDURE — 85025 COMPLETE CBC W/AUTO DIFF WBC: CPT

## 2022-06-06 PROCEDURE — 87086 URINE CULTURE/COLONY COUNT: CPT | Performed by: PHYSICIAN ASSISTANT

## 2022-06-06 PROCEDURE — 82962 GLUCOSE BLOOD TEST: CPT | Performed by: PHYSICIAN ASSISTANT

## 2022-06-06 PROCEDURE — 82800 BLOOD PH: CPT

## 2022-06-06 PROCEDURE — 82009 KETONE BODYS QUAL: CPT

## 2022-06-06 PROCEDURE — 36415 COLL VENOUS BLD VENIPUNCTURE: CPT

## 2022-06-06 PROCEDURE — 81001 URINALYSIS AUTO W/SCOPE: CPT | Performed by: PHYSICIAN ASSISTANT

## 2022-06-06 RX ORDER — SODIUM CHLORIDE 0.9 % (FLUSH) 0.9 %
10 SYRINGE (ML) INJECTION AS NEEDED
Status: DISCONTINUED | OUTPATIENT
Start: 2022-06-06 | End: 2022-06-07 | Stop reason: HOSPADM

## 2022-06-06 RX ORDER — KETOROLAC TROMETHAMINE 30 MG/ML
30 INJECTION, SOLUTION INTRAMUSCULAR; INTRAVENOUS ONCE
Status: DISCONTINUED | OUTPATIENT
Start: 2022-06-06 | End: 2022-06-07 | Stop reason: HOSPADM

## 2022-06-06 RX ADMIN — SODIUM CHLORIDE 1000 ML: 9 INJECTION, SOLUTION INTRAVENOUS at 22:13

## 2022-06-06 NOTE — PROGRESS NOTES
Chief Complaint  Diabetes (Sugar has been running high lately, has not been eating or drinking much, nausea and vomiting, body feels heavy like she wants to pass out, had to leave work early, has FMLA paperwork and wants you to fill it out)    Subjective          Ely Burciaga presents to [facility]  Sugars elevated, nausea, dizziness, not wanting to eat or drink much. Occasionally having difficulty breathing when standing.  Last night sugar was 300's.  Patient has had to leave work because of the way she feels.  She is needing FMLA paperwork filled out for her diabetes and concern for having to miss work in the future because of this.      Objective   Vital Signs:   BP 92/62 (BP Location: Left arm, Patient Position: Sitting, Cuff Size: Adult)   Pulse 88   Temp 98.2 °F (36.8 °C) (Oral)   Resp 17   Wt 75.3 kg (166 lb)   SpO2 100%   BMI 25.24 kg/m²     Physical Exam  Vitals reviewed.   Constitutional:       Appearance: Normal appearance. She is well-developed.   HENT:      Head: Normocephalic and atraumatic.      Mouth/Throat:      Mouth: Mucous membranes are moist.      Pharynx: Oropharynx is clear.   Eyes:      Conjunctiva/sclera: Conjunctivae normal.      Pupils: Pupils are equal, round, and reactive to light.   Cardiovascular:      Rate and Rhythm: Normal rate and regular rhythm.      Heart sounds: No murmur heard.    No friction rub. No gallop.   Pulmonary:      Effort: Pulmonary effort is normal.      Breath sounds: Normal breath sounds. No wheezing or rhonchi.   Skin:     General: Skin is warm and dry.   Neurological:      Mental Status: She is alert and oriented to person, place, and time.      Cranial Nerves: No cranial nerve deficit.   Psychiatric:         Mood and Affect: Mood and affect normal.         Behavior: Behavior normal.         Thought Content: Thought content normal.         Judgment: Judgment normal.        Result Review :           Procedures      Assessment and Plan    Diagnoses and all orders for this visit:    1. Type 1 diabetes mellitus without complication (HCC) (Primary)  Assessment & Plan:  Glucose 143, urine with trace ketones and bili.  Patient currently hypotensive, symptomatic when standing.  Concern for progression into DKA especially since patient has not eaten today.  Would recommend patient go directly to the ER for further evaluation.  Patient is wanting to go by personal vehicle and defers transfer by ambulance at this time. Discussed plan with Dr. Callaway who is in agreements with plan.    Orders:  -     Cancel: POCT Glucose  -     POCT Glucose  -     Urinalysis With Culture If Indicated - Urine, Clean Catch  -     Urinalysis, Microscopic Only - Urine, Clean Catch  -     Urine Culture - Urine, Urine, Clean Catch    2. Ketonuria  Assessment & Plan:  Trace on UA but could progress quickly especially since patient is not able to eat or drink much.  Would recommend ER at this point.      3. Hypotension due to hypovolemia  Assessment & Plan:  Likely secondary to dehydration causing orthostatic hypotension.  Patient likely will need fluids in ER.      Other orders  -     glucose blood test strip; Test blood glucose 4 times a day and as needed  Dispense: 200 each; Refill: 5            Follow Up   Return if symptoms worsen or fail to improve.  Patient was given instructions and counseling regarding her condition or for health maintenance advice. Please see specific information pulled into the AVS if appropriate.

## 2022-06-06 NOTE — ASSESSMENT & PLAN NOTE
Glucose 143, urine with trace ketones and bili.  Patient currently hypotensive, symptomatic when standing.  Concern for progression into DKA especially since patient has not eaten today.  Would recommend patient go directly to the ER for further evaluation.  Patient is wanting to go by personal vehicle and defers transfer by ambulance at this time. Discussed plan with Dr. Callaway who is in agreements with plan.

## 2022-06-06 NOTE — ASSESSMENT & PLAN NOTE
Likely secondary to dehydration causing orthostatic hypotension.  Patient likely will need fluids in ER.

## 2022-06-06 NOTE — PROGRESS NOTES
I have reviewed the notes, assessments, and/or procedures performed by Maile Townsend PA-C, I concur with her documentation of Ely Burciaga.

## 2022-06-06 NOTE — ASSESSMENT & PLAN NOTE
Trace on UA but could progress quickly especially since patient is not able to eat or drink much.  Would recommend ER at this point.

## 2022-06-06 NOTE — TELEPHONE ENCOUNTER
Caller: Ely Burciaga    Relationship to patient: Self    Best call back number: [phone]    Patient is needing: PATIENT IS NEEDING TO BE SEEN TODAY DUE TO HER DIABETES.  DOES NOT WANT TO BE SEEN BY MALE.

## 2022-06-07 LAB — BACTERIA SPEC AEROBE CULT: NO GROWTH

## 2022-06-07 NOTE — ED NOTES
Pt reports headache, body aches, and chills for the last 24 hours. Pt also reports nausea and vomiting prior to arrival. Pt eating when this RN pulled pt back to room. Pt able to ambulate independently, pt reports feeling weaker than normal.

## 2022-06-07 NOTE — ED PROVIDER NOTES
Time: 10:09 PM EDT  Arrived by: private car  Chief Complaint: Lightheadedness, elevated blood sugar  History provided by: Patient  History is limited by: N/A     History of Present Illness:  Patient is a 26 y.o. year old female who presents to the emergency department with complaints of elevated blood sugar and lightheadedness.  She was seen in her PCP office today and told she had ketones in her urine and she might be in DKA so they sent her to the emergency department for evaluation.  Patient describes lightheadedness with position change with nausea and vomiting.  Denies diarrhea.    HPI    Similar Symptoms Previously: Yes  Recently seen: Yes      Patient Care Team  Primary Care Provider: Shilpa Callaway MD    Past Medical History:     No Known Allergies  Past Medical History:   Diagnosis Date   • Anxiety    • Bipolar disorder (HCC)    • Depression    • Diabetes (HCC)    • Diabetes mellitus type I (HCC)    • STD exposure      Past Surgical History:   Procedure Laterality Date   •  SECTION       Family History   Problem Relation Age of Onset   • Heart disease Other    • Prostate cancer Other        Home Medications:  Prior to Admission medications    Medication Sig Start Date End Date Taking? Authorizing Provider   Accu-Chek FastClix Lancets misc 1 each 4 (Four) Times a Day. 10/5/21   Caro Singh APRN   glucose blood test strip Test blood glucose 4 times a day and as needed 22   Maile Townsend PA-C   Insulin Infusion Pump Supplies ([facility] [place] [facility] Set) misc 10 each Every 3 (Three) Days. 21   Shilpa Callaway MD   insulin lispro (HumaLOG) 100 UNIT/ML injection USE IN PUMP. MAXIMUM DAILY DOSE  UNITS DAILY 22   Shilpa Callaway MD   levonorgestrel (Mirena, 52 MG,) 20 MCG/24HR IUD     Provider, MD Damion   glucose blood test strip Test blood glucose 4 times a day and as needed 10/5/21 6/6/22  Caro Singh APRN   HYDROcodone-acetaminophen (NORCO)  "7.5-325 MG per tablet Take 1 tablet by mouth Every 6 (Six) Hours As Needed for Severe Pain . 1/31/22 6/6/22  Harpal Irvin DO   ketorolac (TORADOL) 10 MG tablet Take 1 tablet by mouth Every 6 (Six) Hours As Needed for Moderate Pain . 1/31/22 6/6/22  Harpal Irvin DO   ondansetron ODT (ZOFRAN-ODT) 4 MG disintegrating tablet Place 1 tablet on the tongue Every 6 (Six) Hours As Needed for Nausea or Vomiting. 1/25/22 6/6/22  Adriano Vasquez MD   phenazopyridine (PYRIDIUM) 200 MG tablet Take 1 tablet by mouth 3 (Three) Times a Day As Needed for Bladder Spasms. 1/31/22 6/6/22  Harpal Irvin DO        Social History:   Social History     Tobacco Use   • Smoking status: Never Smoker   • Smokeless tobacco: Never Used   Vaping Use   • Vaping Use: Every day   • Substances: Nicotine, Flavoring   • Devices: Disposable   Substance Use Topics   • Alcohol use: Not Currently   • Drug use: Never     Comment: Denies substance abuse     Recent travel: no     Review of Systems:  Review of Systems   Constitutional: Negative for chills and fever.   HENT: Negative for congestion, rhinorrhea and sore throat.    Eyes: Negative for pain and visual disturbance.   Respiratory: Negative for apnea, cough, chest tightness and shortness of breath.    Cardiovascular: Negative for chest pain and palpitations.   Gastrointestinal: Positive for nausea and vomiting. Negative for abdominal pain and diarrhea.   Genitourinary: Negative for difficulty urinating and dysuria.   Musculoskeletal: Negative for joint swelling and myalgias.   Skin: Negative for color change.   Neurological: Negative for seizures and headaches.   Psychiatric/Behavioral: Negative.    All other systems reviewed and are negative.       Physical Exam:  /67 (BP Location: Right arm, Patient Position: Lying)   Pulse 80   Temp 98 °F (36.7 °C) (Oral)   Resp 21   Ht 172.7 cm (68\")   Wt 74.8 kg (164 lb 14.5 oz)   SpO2 98%   BMI 25.07 kg/m²     Physical Exam  Vitals and nursing note " reviewed.   Constitutional:       General: She is not in acute distress.     Appearance: Normal appearance. She is not toxic-appearing.   HENT:      Head: Normocephalic and atraumatic.      Jaw: There is normal jaw occlusion.   Eyes:      General: Lids are normal.      Extraocular Movements: Extraocular movements intact.      Conjunctiva/sclera: Conjunctivae normal.      Pupils: Pupils are equal, round, and reactive to light.   Cardiovascular:      Rate and Rhythm: Normal rate and regular rhythm.      Pulses: Normal pulses.      Heart sounds: Normal heart sounds.   Pulmonary:      Effort: Pulmonary effort is normal. No respiratory distress.      Breath sounds: Normal breath sounds. No wheezing or rhonchi.   Abdominal:      General: Abdomen is flat.      Palpations: Abdomen is soft.      Tenderness: There is no abdominal tenderness. There is no guarding or rebound.   Musculoskeletal:         General: Normal range of motion.      Cervical back: Normal range of motion and neck supple.      Right lower leg: No edema.      Left lower leg: No edema.   Skin:     General: Skin is warm and dry.   Neurological:      Mental Status: She is alert and oriented to person, place, and time. Mental status is at baseline.   Psychiatric:         Mood and Affect: Mood normal.                Medications in the Emergency Department:  Medications   sodium chloride 0.9 % flush 10 mL (has no administration in time range)   sodium chloride 0.9 % bolus 1,000 mL (1,000 mL Intravenous New Bag 6/6/22 2213)   ketorolac (TORADOL) injection 30 mg (30 mg Intravenous Not Given 6/6/22 2209)        Labs  Lab Results (last 24 hours)     Procedure Component Value Units Date/Time    POCT Glucose [[id number]]  (Abnormal) Collected: 06/06/22 1213    Specimen: Blood Updated: 06/06/22 1213     Glucose 143 mg/dL     Urinalysis With Culture If Indicated - Urine, Clean Catch [[id number]]  (Abnormal) Collected: 06/06/22 1213    Specimen: Urine, Clean Catch Updated:  06/06/22 1222     Color, UA Bonnie     Appearance, UA Clear     pH, UA <=5.0     Specific Gravity, UA >=1.030     Glucose, UA Negative     Ketones, UA Trace     Bilirubin, UA Moderate (2+)     Blood, UA Negative     Protein, UA 30 mg/dL (1+)     Leuk Esterase, UA Negative     Nitrite, UA Negative     Urobilinogen, UA 1.0 E.U./dL    Narrative:      In absence of clinical symptoms, the presence of pyuria, bacteria, and/or nitrites on the urinalysis result does not correlate with infection.    Urinalysis, Microscopic Only - Urine, Clean Catch [[id number]]  (Abnormal) Collected: 06/06/22 1213    Specimen: Urine, Clean Catch Updated: 06/06/22 2217     RBC, UA None Seen /HPF      WBC, UA None Seen /HPF      Bacteria, UA None Seen /HPF      Squamous Epithelial Cells, UA 7-12 /HPF      Hyaline Casts, UA None Seen /LPF      Amorphous Crystals, UA Large/3+ /HPF      Methodology Automated Microscopy    Urine Culture - Urine, Urine, Clean Catch [[id number]] Collected: 06/06/22 1213    Specimen: Urine, Clean Catch Updated: 06/06/22 1221    POC Glucose Once [[id number]]  (Abnormal) Collected: 06/06/22 1314    Specimen: Blood Updated: 06/06/22 1316     Glucose 160 mg/dL      Comment: Serial Number: [id number]:  [id number]       POC Glucose Once [[id number]]  (Abnormal) Collected: 06/06/22 1552    Specimen: Blood Updated: 06/06/22 1552     Glucose 118 mg/dL      Comment: Serial Number: [id number]:  [id number]       CBC & Differential [[id number]]  (Abnormal) Collected: 06/06/22 1604    Specimen: Blood Updated: 06/06/22 1616    Narrative:      The following orders were created for panel order CBC & Differential.  Procedure                               Abnormality         Status                     ---------                               -----------         ------                     CBC Auto Differential[[id number]]        Abnormal            Final result                 Please view results for these tests on the individual  orders.    Comprehensive Metabolic Panel [[id number]] Collected: 06/06/22 1604    Specimen: Blood Updated: 06/06/22 1648     Glucose 97 mg/dL      BUN 12 mg/dL      Creatinine 0.62 mg/dL      Sodium 139 mmol/L      Potassium 3.5 mmol/L      Chloride 103 mmol/L      CO2 24.4 mmol/L      Calcium 9.7 mg/dL      Total Protein 7.5 g/dL      Albumin 4.70 g/dL      ALT (SGPT) 18 U/L      AST (SGOT) 18 U/L      Alkaline Phosphatase 102 U/L      Total Bilirubin 0.6 mg/dL      Globulin 2.8 gm/dL      A/G Ratio 1.7 g/dL      BUN/Creatinine Ratio 19.4     Anion Gap 11.6 mmol/L      eGFR 124.6 mL/min/1.73      Comment: National Kidney Foundation and American Society of Nephrology (ASN) Task Force recommended calculation based on the Chronic Kidney Disease Epidemiology Collaboration (CKD-EPI) equation refit without adjustment for race.       Narrative:      GFR Normal >60  Chronic Kidney Disease <60  Kidney Failure <15      CBC Auto Differential [[id number]]  (Abnormal) Collected: 06/06/22 1604    Specimen: Blood Updated: 06/06/22 1616     WBC 5.99 10*3/mm3      RBC 4.84 10*6/mm3      Hemoglobin 14.5 g/dL      Hematocrit 41.4 %      MCV 85.5 fL      MCH 30.0 pg      MCHC 35.0 g/dL      RDW 11.4 %      RDW-SD 35.5 fl      MPV 9.9 fL      Platelets 415 10*3/mm3      Neutrophil % 43.7 %      Lymphocyte % 45.9 %      Monocyte % 7.7 %      Eosinophil % 1.7 %      Basophil % 0.8 %      Immature Grans % 0.2 %      Neutrophils, Absolute 2.62 10*3/mm3      Lymphocytes, Absolute 2.75 10*3/mm3      Monocytes, Absolute 0.46 10*3/mm3      Eosinophils, Absolute 0.10 10*3/mm3      Basophils, Absolute 0.05 10*3/mm3      Immature Grans, Absolute 0.01 10*3/mm3      nRBC 0.0 /100 WBC     Acetone [[id number]]  (Normal) Collected: 06/06/22 1604    Specimen: Blood Updated: 06/06/22 1637     Acetone Negative    pH, Venous [[id number]]  (Normal) Collected: 06/06/22 1700    Specimen: Blood Updated: 06/06/22 1703     pH, Venous 7.380 pH Units     Urinalysis  With Microscopic If Indicated (No Culture) - Urine, Clean Catch [[id number]]  (Abnormal) Collected: 06/06/22 2004    Specimen: Urine, Clean Catch Updated: 06/06/22 2025     Color, UA Dark Yellow     Appearance, UA Turbid     pH, UA 5.5     Specific Gravity, UA 1.026     Glucose, UA Negative     Ketones, UA 15 mg/dL (1+)     Bilirubin, UA Small (1+)     Blood, UA Negative     Protein, UA 30 mg/dL (1+)     Leuk Esterase, UA Small (1+)     Nitrite, UA Negative     Urobilinogen, UA 1.0 E.U./dL    Urinalysis, Microscopic Only - Urine, Clean Catch [[id number]]  (Abnormal) Collected: 06/06/22 2004    Specimen: Urine, Clean Catch Updated: 06/06/22 2220     RBC, UA None Seen /HPF      WBC, UA 6-12 /HPF      Bacteria, UA 4+ /HPF      Squamous Epithelial Cells, UA 21-30 /HPF      Hyaline Casts, UA 3-6 /LPF      Mucus, UA Moderate/2+ /HPF      Methodology Manual Light Microscopy           Imaging:  No Radiology Exams Resulted Within Past 24 Hours    Procedures:  Procedures    Progress  ED Course as of 06/06/22 2236   Mon Jun 06, 2022   1555 Blood sugars have been in 500s the past few days. Patient is fatigued and weak. Having hot and cold flashes. Passed out at work today. Patient states her PCP did UA today had high ketones so recommended to come to ED for possibility of DKA. Has been in DKA before. Taking meds as prescribed. [MD]      ED Course User Index  [MD] King Pool, HOLLY                            Medical Decision Making:  MDM  Number of Diagnoses or Management Options  Diagnosis management comments: In summary this is a 26-year-old F1 diabetic presents emergency department for evaluation for DKA.  Laboratory evaluation including urinalysis, CBC, CMP were performed and are unremarkable/negative for diabetic ketoacidosis.  She does appear dehydrated.  She is otherwise well-appearing.  She will receive IV fluids in the emergency department prior to discharge home.       Amount and/or Complexity of Data  Reviewed  Decide to obtain previous medical records or to obtain history from someone other than the patient: yes         Final diagnoses:   Dehydration        Disposition:  ED Disposition     ED Disposition   Discharge    Condition   Stable    Comment   --             This medical record created using voice recognition software.           Julio Fischer MD  06/06/22 9116

## 2022-06-15 ENCOUNTER — TELEPHONE (OUTPATIENT)
Dept: INTERNAL MEDICINE | Facility: CLINIC | Age: 27
End: 2022-06-15

## 2022-06-15 NOTE — TELEPHONE ENCOUNTER
Called patient to discuss FMLA paperwork. Patient is requesting to see Dr. Callaway. Dr. Callaway is okay with seeing patient on Friday but is uncertain that she will be in the office on Friday so she prefers her to see another provider. No answer; left VM for patient informing of message from Dr. Callaway.     Please assist patient with scheduling an appointment with another provider in the office in order for her to get her FMLA paperwork completed.

## 2022-07-06 RX ORDER — INSULIN LISPRO 100 [IU]/ML
INJECTION, SOLUTION INTRAVENOUS; SUBCUTANEOUS
Qty: 30 ML | Refills: 1 | Status: SHIPPED | OUTPATIENT
Start: 2022-07-06 | End: 2022-08-08

## 2022-07-14 ENCOUNTER — TELEPHONE (OUTPATIENT)
Dept: CASE MANAGEMENT | Facility: OTHER | Age: 27
End: 2022-07-14

## 2022-07-14 ENCOUNTER — REFERRAL TRIAGE (OUTPATIENT)
Dept: CASE MANAGEMENT | Facility: OTHER | Age: 27
End: 2022-07-14

## 2022-07-14 ENCOUNTER — OFFICE VISIT (OUTPATIENT)
Dept: DIABETES SERVICES | Facility: HOSPITAL | Age: 27
End: 2022-07-14

## 2022-07-14 VITALS
HEART RATE: 103 BPM | WEIGHT: 169.97 LBS | SYSTOLIC BLOOD PRESSURE: 119 MMHG | HEIGHT: 68 IN | OXYGEN SATURATION: 99 % | TEMPERATURE: 97.7 F | BODY MASS INDEX: 25.76 KG/M2 | DIASTOLIC BLOOD PRESSURE: 80 MMHG

## 2022-07-14 DIAGNOSIS — E10.65 UNCONTROLLED TYPE 1 DIABETES MELLITUS WITH HYPERGLYCEMIA: Primary | ICD-10-CM

## 2022-07-14 DIAGNOSIS — E10.9 TYPE 1 DIABETES MELLITUS WITHOUT COMPLICATION: Primary | ICD-10-CM

## 2022-07-14 DIAGNOSIS — R82.4 KETONURIA: ICD-10-CM

## 2022-07-14 DIAGNOSIS — E66.3 OVERWEIGHT (BMI 25.0-29.9): ICD-10-CM

## 2022-07-14 DIAGNOSIS — E10.40 TYPE 1 DIABETES MELLITUS WITH DIABETIC NEUROPATHY: ICD-10-CM

## 2022-07-14 LAB
EXPIRATION DATE: ABNORMAL
GLUCOSE BLDC GLUCOMTR-MCNC: 278 MG/DL (ref 70–99)
HBA1C MFR BLD: 9.3 %
Lab: ABNORMAL

## 2022-07-14 PROCEDURE — 82962 GLUCOSE BLOOD TEST: CPT | Performed by: NURSE PRACTITIONER

## 2022-07-14 PROCEDURE — G0463 HOSPITAL OUTPT CLINIC VISIT: HCPCS | Performed by: NURSE PRACTITIONER

## 2022-07-14 PROCEDURE — 99214 OFFICE O/P EST MOD 30 MIN: CPT | Performed by: NURSE PRACTITIONER

## 2022-07-14 PROCEDURE — 83036 HEMOGLOBIN GLYCOSYLATED A1C: CPT | Performed by: NURSE PRACTITIONER

## 2022-07-14 NOTE — TELEPHONE ENCOUNTER
Called patient to offer assistance with diabetes/supplies, possibly social work. No answer, left message.

## 2022-07-18 ENCOUNTER — TELEPHONE (OUTPATIENT)
Dept: CASE MANAGEMENT | Facility: OTHER | Age: 27
End: 2022-07-18

## 2022-07-18 NOTE — TELEPHONE ENCOUNTER
Called again to offer CCM. Left detailed message and informed pt that I also consulted social work.    Will also send [employer] message.    Will call one more time later this week.

## 2022-07-21 ENCOUNTER — PATIENT OUTREACH (OUTPATIENT)
Dept: CASE MANAGEMENT | Facility: CLINIC | Age: 27
End: 2022-07-21

## 2022-07-21 ENCOUNTER — TELEPHONE (OUTPATIENT)
Dept: CASE MANAGEMENT | Facility: OTHER | Age: 27
End: 2022-07-21

## 2022-07-21 NOTE — OUTREACH NOTE
Patient Outreach    SW attempted x 4 to reach patient and LVM. SW unable to reach. SW available in the future as additional needs arise.     CHRISTINE FLOREZ -   Ambulatory Case Management    7/21/2022, 09:42 EDT

## 2022-07-21 NOTE — TELEPHONE ENCOUNTER
Called again to offer CCM, no answer. Closing out referral. Should pt contact me in future, I can resume case management. SW also closed out due to lack of contact.

## 2022-07-26 ENCOUNTER — EDUCATION (OUTPATIENT)
Dept: DIABETES SERVICES | Facility: HOSPITAL | Age: 27
End: 2022-07-26

## 2022-07-28 ENCOUNTER — APPOINTMENT (OUTPATIENT)
Dept: DIABETES SERVICES | Facility: HOSPITAL | Age: 27
End: 2022-07-28

## 2022-08-08 RX ORDER — INSULIN LISPRO 100 [IU]/ML
INJECTION, SOLUTION INTRAVENOUS; SUBCUTANEOUS
Qty: 30 ML | Refills: 1 | Status: SHIPPED | OUTPATIENT
Start: 2022-08-08 | End: 2022-09-10

## 2022-08-16 PROCEDURE — 82962 GLUCOSE BLOOD TEST: CPT

## 2022-08-17 ENCOUNTER — APPOINTMENT (OUTPATIENT)
Dept: GENERAL RADIOLOGY | Facility: HOSPITAL | Age: 27
End: 2022-08-17

## 2022-08-17 ENCOUNTER — HOSPITAL ENCOUNTER (EMERGENCY)
Facility: HOSPITAL | Age: 27
Discharge: HOME | End: 2022-08-17
Attending: EMERGENCY MEDICINE | Admitting: EMERGENCY MEDICINE

## 2022-08-17 VITALS
OXYGEN SATURATION: 98 % | TEMPERATURE: 98.5 F | SYSTOLIC BLOOD PRESSURE: 131 MMHG | DIASTOLIC BLOOD PRESSURE: 81 MMHG | HEART RATE: 83 BPM | HEIGHT: 68 IN | RESPIRATION RATE: 22 BRPM | WEIGHT: 171.74 LBS | BODY MASS INDEX: 26.03 KG/M2

## 2022-08-17 DIAGNOSIS — B34.9 ACUTE VIRAL SYNDROME: Primary | ICD-10-CM

## 2022-08-17 DIAGNOSIS — E10.65 HYPERGLYCEMIA DUE TO TYPE 1 DIABETES MELLITUS: ICD-10-CM

## 2022-08-17 LAB
ACETONE BLD QL: NEGATIVE
ALBUMIN SERPL-MCNC: 4.9 G/DL (ref 3.5–5.2)
ALBUMIN/GLOB SERPL: 1.9 G/DL
ALP SERPL-CCNC: 128 U/L (ref 39–117)
ALT SERPL W P-5'-P-CCNC: 17 U/L (ref 1–33)
ANION GAP SERPL CALCULATED.3IONS-SCNC: 13.5 MMOL/L (ref 5–15)
AST SERPL-CCNC: 18 U/L (ref 1–32)
BASOPHILS # BLD AUTO: 0.06 10*3/MM3 (ref 0–0.2)
BASOPHILS NFR BLD AUTO: 1 % (ref 0–1.5)
BILIRUB SERPL-MCNC: 0.7 MG/DL (ref 0–1.2)
BILIRUB UR QL STRIP: NEGATIVE
BUN SERPL-MCNC: 18 MG/DL (ref 6–20)
BUN/CREAT SERPL: 23.4 (ref 7–25)
CALCIUM SPEC-SCNC: 10 MG/DL (ref 8.6–10.5)
CHLORIDE SERPL-SCNC: 96 MMOL/L (ref 98–107)
CLARITY UR: CLEAR
CO2 SERPL-SCNC: 21.5 MMOL/L (ref 22–29)
COLOR UR: YELLOW
CREAT SERPL-MCNC: 0.77 MG/DL (ref 0.57–1)
DEPRECATED RDW RBC AUTO: 34.2 FL (ref 37–54)
EGFRCR SERPLBLD CKD-EPI 2021: 107.9 ML/MIN/1.73
EOSINOPHIL # BLD AUTO: 0.08 10*3/MM3 (ref 0–0.4)
EOSINOPHIL NFR BLD AUTO: 1.4 % (ref 0.3–6.2)
ERYTHROCYTE [DISTWIDTH] IN BLOOD BY AUTOMATED COUNT: 11 % (ref 12.3–15.4)
FLUAV AG NPH QL: NEGATIVE
FLUBV AG NPH QL IA: NEGATIVE
GLOBULIN UR ELPH-MCNC: 2.6 GM/DL
GLUCOSE BLDC GLUCOMTR-MCNC: 250 MG/DL (ref 70–99)
GLUCOSE BLDC GLUCOMTR-MCNC: 285 MG/DL (ref 70–99)
GLUCOSE BLDC GLUCOMTR-MCNC: 453 MG/DL (ref 70–99)
GLUCOSE SERPL-MCNC: 415 MG/DL (ref 65–99)
GLUCOSE UR STRIP-MCNC: ABNORMAL MG/DL
HCG INTACT+B SERPL-ACNC: <0.5 MIU/ML
HCT VFR BLD AUTO: 38.1 % (ref 34–46.6)
HGB BLD-MCNC: 13.9 G/DL (ref 12–15.9)
HGB UR QL STRIP.AUTO: NEGATIVE
HOLD SPECIMEN: NORMAL
HOLD SPECIMEN: NORMAL
IMM GRANULOCYTES # BLD AUTO: 0.02 10*3/MM3 (ref 0–0.05)
IMM GRANULOCYTES NFR BLD AUTO: 0.3 % (ref 0–0.5)
KETONES UR QL STRIP: NEGATIVE
LEUKOCYTE ESTERASE UR QL STRIP.AUTO: NEGATIVE
LYMPHOCYTES # BLD AUTO: 1.91 10*3/MM3 (ref 0.7–3.1)
LYMPHOCYTES NFR BLD AUTO: 32.5 % (ref 19.6–45.3)
MCH RBC QN AUTO: 30.6 PG (ref 26.6–33)
MCHC RBC AUTO-ENTMCNC: 36.5 G/DL (ref 31.5–35.7)
MCV RBC AUTO: 83.9 FL (ref 79–97)
MONOCYTES # BLD AUTO: 0.45 10*3/MM3 (ref 0.1–0.9)
MONOCYTES NFR BLD AUTO: 7.7 % (ref 5–12)
NEUTROPHILS NFR BLD AUTO: 3.36 10*3/MM3 (ref 1.7–7)
NEUTROPHILS NFR BLD AUTO: 57.1 % (ref 42.7–76)
NITRITE UR QL STRIP: NEGATIVE
NRBC BLD AUTO-RTO: 0 /100 WBC (ref 0–0.2)
PH UR STRIP.AUTO: <=5 [PH] (ref 5–8)
PLATELET # BLD AUTO: 336 10*3/MM3 (ref 140–450)
PMV BLD AUTO: 9.7 FL (ref 6–12)
POTASSIUM SERPL-SCNC: 4.4 MMOL/L (ref 3.5–5.2)
PROT SERPL-MCNC: 7.5 G/DL (ref 6–8.5)
PROT UR QL STRIP: NEGATIVE
RBC # BLD AUTO: 4.54 10*6/MM3 (ref 3.77–5.28)
SARS-COV-2 RNA PNL SPEC NAA+PROBE: NOT DETECTED
SODIUM SERPL-SCNC: 131 MMOL/L (ref 136–145)
SP GR UR STRIP: >1.03 (ref 1–1.03)
UROBILINOGEN UR QL STRIP: ABNORMAL
WBC NRBC COR # BLD: 5.88 10*3/MM3 (ref 3.4–10.8)
WHOLE BLOOD HOLD COAG: NORMAL
WHOLE BLOOD HOLD SPECIMEN: NORMAL

## 2022-08-17 PROCEDURE — 85025 COMPLETE CBC W/AUTO DIFF WBC: CPT

## 2022-08-17 PROCEDURE — 87804 INFLUENZA ASSAY W/OPTIC: CPT | Performed by: EMERGENCY MEDICINE

## 2022-08-17 PROCEDURE — 71045 X-RAY EXAM CHEST 1 VIEW: CPT

## 2022-08-17 PROCEDURE — 96374 THER/PROPH/DIAG INJ IV PUSH: CPT

## 2022-08-17 PROCEDURE — 99283 EMERGENCY DEPT VISIT LOW MDM: CPT

## 2022-08-17 PROCEDURE — 82962 GLUCOSE BLOOD TEST: CPT

## 2022-08-17 PROCEDURE — 81003 URINALYSIS AUTO W/O SCOPE: CPT

## 2022-08-17 PROCEDURE — 84702 CHORIONIC GONADOTROPIN TEST: CPT | Performed by: EMERGENCY MEDICINE

## 2022-08-17 PROCEDURE — 36415 COLL VENOUS BLD VENIPUNCTURE: CPT

## 2022-08-17 PROCEDURE — 25010000002 KETOROLAC TROMETHAMINE PER 15 MG: Performed by: EMERGENCY MEDICINE

## 2022-08-17 PROCEDURE — C9803 HOPD COVID-19 SPEC COLLECT: HCPCS | Performed by: EMERGENCY MEDICINE

## 2022-08-17 PROCEDURE — 82009 KETONE BODYS QUAL: CPT | Performed by: EMERGENCY MEDICINE

## 2022-08-17 PROCEDURE — U0004 COV-19 TEST NON-CDC HGH THRU: HCPCS | Performed by: EMERGENCY MEDICINE

## 2022-08-17 PROCEDURE — 80053 COMPREHEN METABOLIC PANEL: CPT | Performed by: EMERGENCY MEDICINE

## 2022-08-17 RX ORDER — SODIUM CHLORIDE 0.9 % (FLUSH) 0.9 %
10 SYRINGE (ML) INJECTION AS NEEDED
Status: DISCONTINUED | OUTPATIENT
Start: 2022-08-17 | End: 2022-08-17 | Stop reason: HOSPADM

## 2022-08-17 RX ORDER — KETOROLAC TROMETHAMINE 30 MG/ML
30 INJECTION, SOLUTION INTRAMUSCULAR; INTRAVENOUS ONCE
Status: COMPLETED | OUTPATIENT
Start: 2022-08-17 | End: 2022-08-17

## 2022-08-17 RX ADMIN — SODIUM CHLORIDE 2000 ML: 9 INJECTION, SOLUTION INTRAVENOUS at 16:25

## 2022-08-17 RX ADMIN — KETOROLAC TROMETHAMINE 30 MG: 30 INJECTION, SOLUTION INTRAMUSCULAR; INTRAVENOUS at 19:38

## 2022-08-17 NOTE — ED PROVIDER NOTES
"Time: 4:34 PM EDT  Arrived by: private car  Chief Complaint: hyperglycemia  History provided by: patient  History is limited by: N/A     History of Present Illness:  Patient is a 26 y.o.  female that presents to the emergency department with hyperglycemia    Patient arrives to ED via private car. Patient has a medical history of type one diabetes,uterine cysts with history rupture, anxiety, depression, bipolar disorder. Patient has no history of smoking, no current alcohol use, and no history of drug use.    Patient started \"not feeling good\"  (2022) and ended up calling into work. Patient was at work today (2022) when she started experiencing worsening symptoms of hyperglycemia. Patient reports they are similar to symptoms that she's had in the past. Patient confirms symptoms of light-headed, near syncope, dizziness, visual changes blurry vision and \"black spots\", general weakness, SOB, headache with radiation to spine, water retention upper extremities and lower extremities, cramping in upper extremities and lower extremities, fever (subjective), diaphoresis, nausea. Patient describes SOB symptoms feels like she cannot get enough O2 with each breath. Patient reports no known exposure to illness or family members with illness. Patient denies medical history anemia.      History provided by:  Patient   used: No        Patient Care Team  Primary Care Provider: Shilpa Callaway MD    Past Medical History:     No Known Allergies  Past Medical History:   Diagnosis Date   • Anxiety    • Bipolar disorder (HCC)    • Depression    • Diabetes (HCC)    • Diabetes mellitus type I (HCC)    • STD exposure      Past Surgical History:   Procedure Laterality Date   •  SECTION       Family History   Problem Relation Age of Onset   • Heart disease Other    • Prostate cancer Other        Home Medications:  Prior to Admission medications    Medication Sig Start Date End Date Taking? " "Authorizing Provider   Accu-Chek FastClix Lancets misc 1 each 4 (Four) Times a Day. 10/5/21   Caro Singh APRN   glucose blood test strip Test blood glucose 4 times a day and as needed 6/6/22   Maile Townsend PA-C   HumaLOG 100 UNIT/ML injection USE IN PUMP. MAXIMUM DAILY DOSE  UNITS DAILY 8/8/22   Carrol Mejia APRN   Insulin Infusion Pump Supplies (MiniMed Ashvin Infusion Set) misc 10 each Every 3 (Three) Days. 8/12/21   Shilpa Callaway MD   levonorgestrel (Mirena, 52 MG,) 20 MCG/24HR IUD     Provider, MD Damion        Social History:   Social History     Tobacco Use   • Smoking status: Never Smoker   • Smokeless tobacco: Never Used   Vaping Use   • Vaping Use: Every day   • Substances: Nicotine, Flavoring   • Devices: Disposable   Substance Use Topics   • Alcohol use: Not Currently   • Drug use: Never     Comment: Denies substance abuse       Review of Systems:  Review of Systems   Constitutional: Positive for diaphoresis, fatigue and fever (subjective). Negative for chills.   HENT: Negative for congestion, ear pain and sore throat.    Eyes: Negative for pain.   Respiratory: Positive for shortness of breath (feels like not getting enough O2). Negative for cough and chest tightness.    Cardiovascular: Positive for leg swelling (and cramping). Negative for chest pain.        Positive arm swelling and cramping   Gastrointestinal: Positive for nausea. Negative for abdominal pain, diarrhea and vomiting.   Genitourinary: Negative for flank pain and hematuria.   Musculoskeletal: Negative for joint swelling.   Skin: Negative for pallor.   Neurological: Positive for dizziness, weakness, light-headedness and headaches (radiates to spine). Negative for seizures and syncope (near).   All other systems reviewed and are negative.       Physical Exam:  /81   Pulse 83   Temp 98.5 °F (36.9 °C) (Oral)   Resp 22   Ht 172.7 cm (68\")   Wt 77.9 kg (171 lb 11.8 oz)   SpO2 98%   BMI 26.11 kg/m² "     Physical Exam  Vitals and nursing note reviewed.   Constitutional:       General: She is not in acute distress.     Appearance: Normal appearance. She is ill-appearing. She is not toxic-appearing.   HENT:      Head: Normocephalic and atraumatic.      Mouth/Throat:      Mouth: Mucous membranes are moist.   Eyes:      General: No scleral icterus.  Cardiovascular:      Rate and Rhythm: Normal rate and regular rhythm.      Pulses: Normal pulses.      Heart sounds: Normal heart sounds.   Pulmonary:      Effort: Pulmonary effort is normal. Tachypnea (mild) present. No respiratory distress.      Breath sounds: Normal breath sounds.   Abdominal:      General: Abdomen is flat.      Palpations: Abdomen is soft.      Tenderness: There is abdominal tenderness (mild) in the epigastric area.   Musculoskeletal:         General: Normal range of motion.      Cervical back: Normal range of motion and neck supple.      Right lower le+ Edema present.      Left lower le+ Edema present.   Skin:     General: Skin is warm and dry.      Findings: No rash.   Neurological:      General: No focal deficit present.      Mental Status: She is alert and oriented to person, place, and time. Mental status is at baseline.      Sensory: Sensation is intact.      Motor: Motor function is intact.      Coordination: Coordination is intact.                Medications in the Emergency Department:  Medications   sodium chloride 0.9 % flush 10 mL (has no administration in time range)   sodium chloride 0.9 % bolus 2,000 mL (2,000 mL Intravenous New Bag 22 1625)   ketorolac (TORADOL) injection 30 mg (30 mg Intravenous Given 22 1938)        Labs  Lab Results (last 24 hours)     Procedure Component Value Units Date/Time    POC Glucose Once [[id number]]  (Abnormal) Collected: 22 1513    Specimen: Blood Updated: 22 1516     Glucose 453 mg/dL      Comment: Serial Number: [id number]:  [id number]       CBC & Differential  [[id number]]  (Abnormal) Collected: 08/17/22 1519    Specimen: Blood from Arm, Right Updated: 08/17/22 1532    Narrative:      The following orders were created for panel order CBC & Differential.  Procedure                               Abnormality         Status                     ---------                               -----------         ------                     CBC Auto Differential[[id number]]        Abnormal            Final result                 Please view results for these tests on the individual orders.    Comprehensive Metabolic Panel [[id number]]  (Abnormal) Collected: 08/17/22 1519    Specimen: Blood from Arm, Right Updated: 08/17/22 1603     Glucose 415 mg/dL      BUN 18 mg/dL      Creatinine 0.77 mg/dL      Sodium 131 mmol/L      Potassium 4.4 mmol/L      Chloride 96 mmol/L      CO2 21.5 mmol/L      Calcium 10.0 mg/dL      Total Protein 7.5 g/dL      Albumin 4.90 g/dL      ALT (SGPT) 17 U/L      AST (SGOT) 18 U/L      Alkaline Phosphatase 128 U/L      Total Bilirubin 0.7 mg/dL      Globulin 2.6 gm/dL      A/G Ratio 1.9 g/dL      BUN/Creatinine Ratio 23.4     Anion Gap 13.5 mmol/L      eGFR 107.9 mL/min/1.73      Comment: National Kidney Foundation and American Society of Nephrology (ASN) Task Force recommended calculation based on the Chronic Kidney Disease Epidemiology Collaboration (CKD-EPI) equation refit without adjustment for race.       Narrative:      GFR Normal >60  Chronic Kidney Disease <60  Kidney Failure <15      CBC Auto Differential [[id number]]  (Abnormal) Collected: 08/17/22 1519    Specimen: Blood from Arm, Right Updated: 08/17/22 1532     WBC 5.88 10*3/mm3      RBC 4.54 10*6/mm3      Hemoglobin 13.9 g/dL      Hematocrit 38.1 %      MCV 83.9 fL      MCH 30.6 pg      MCHC 36.5 g/dL      RDW 11.0 %      RDW-SD 34.2 fl      MPV 9.7 fL      Platelets 336 10*3/mm3      Neutrophil % 57.1 %      Lymphocyte % 32.5 %      Monocyte % 7.7 %      Eosinophil % 1.4 %      Basophil % 1.0 %       Immature Grans % 0.3 %      Neutrophils, Absolute 3.36 10*3/mm3      Lymphocytes, Absolute 1.91 10*3/mm3      Monocytes, Absolute 0.45 10*3/mm3      Eosinophils, Absolute 0.08 10*3/mm3      Basophils, Absolute 0.06 10*3/mm3      Immature Grans, Absolute 0.02 10*3/mm3      nRBC 0.0 /100 WBC     Acetone [[id number]]  (Normal) Collected: 08/17/22 1519    Specimen: Blood from Arm, Right Updated: 08/17/22 1625     Acetone Negative    hCG, Quantitative, Pregnancy [[id number]] Collected: 08/17/22 1519    Specimen: Blood from Arm, Right Updated: 08/17/22 1752     HCG Quantitative <0.50 mIU/mL     Narrative:      HCG Ranges by Gestational Age    Females - non-pregnant premenopausal   </= 1mIU/mL HCG  Females - postmenopausal               </= 7mIU/mL HCG    3 Weeks       5.4   -      72 mIU/mL  4 Weeks      10.2   -     708 mIU/mL  5 Weeks       217   -   8,245 mIU/mL  6 Weeks       152   -  32,177 mIU/mL  7 Weeks     4,059   - 153,767 mIU/mL  8 Weeks    31,366   - 149,094 mIU/mL  9 Weeks    59,109   - 135,901 mIU/mL  10 Weeks   44,186   - 170,409 mIU/mL  12 Weeks   27,107   - 201,615 mIU/mL  14 Weeks   24,302   -  93,646 mIU/mL  15 Weeks   12,540   -  69,747 mIU/mL  16 Weeks    8,904   -  55,332 mIU/mL  17 Weeks    8,240   -  51,793 mIU/mL  18 Weeks    9,649   -  55,271 mIU/mL    Results may be falsely decreased if patient taking Biotin.      Urinalysis With Microscopic If Indicated (No Culture) - Urine, Clean Catch [[id number]]  (Abnormal) Collected: 08/17/22 1525    Specimen: Urine, Clean Catch Updated: 08/17/22 1537     Color, UA Yellow     Appearance, UA Clear     pH, UA <=5.0     Specific Gravity, UA >1.030     Glucose, UA >=1000 mg/dL (3+)     Ketones, UA Negative     Bilirubin, UA Negative     Blood, UA Negative     Protein, UA Negative     Leuk Esterase, UA Negative     Nitrite, UA Negative     Urobilinogen, UA 0.2 E.U./dL    Narrative:      Urine microscopic not indicated.    POC Glucose Once [[id number]]  (Abnormal)  Collected: 08/17/22 1632    Specimen: Blood Updated: 08/17/22 1633     Glucose 250 mg/dL      Comment: Serial Number: [id number]:  [id number]       COVID-19,APTIMA PANTHER(ADIEL),[facility] KURT/ OSBALDO, NP/OP SWAB IN UTM/VTM/SALINE TRANSPORT MEDIA,24 HR TAT - Swab, Nasopharynx [[id number]] Collected: 08/17/22 1740    Specimen: Swab from Nasopharynx Updated: 08/17/22 1744    Influenza Antigen, Rapid - Swab, Nasopharynx [[id number]]  (Normal) Collected: 08/17/22 1740    Specimen: Swab from Nasopharynx Updated: 08/17/22 1813     Influenza A Ag, EIA Negative     Influenza B Ag, EIA Negative    POC Glucose Once [[id number]]  (Abnormal) Collected: 08/17/22 1929    Specimen: Blood Updated: 08/17/22 1930     Glucose 285 mg/dL      Comment: Serial Number: [id number]:  [id number]              Imaging:  XR Chest 1 View    Result Date: 8/17/2022  PROCEDURE: XR CHEST 1 VW  COMPARISON: [facility], , CHEST AP/PA 1 VIEW, 2/19/2020, 3:30.  INDICATIONS: Cough  FINDINGS:  The lungs are adequately expanded. There is no focal consolidation, pneumothorax, or obvious pleural effusion. The pulmonary vasculature appears within normal limits. The cardiac and mediastinal contours are within normal limits. The osseous structures appear intact.        No acute cardiopulmonary process.        JAIME SAMANO MD       Electronically Signed and Approved By: JAIME SAMANO MD on 8/17/2022 at 18:00                EKG:      Procedures:  Procedures    Progress                      The patient was seen and evaluated the ED by me.  The above history and physical examination was performed as document.  The diagnostic data was obtained.  Results reviewed.  Discussed with the patient.  Patient was given 2 L of IV crystalloids.  Patient was also given IV Toradol.  Patient's blood sugars responded nicely to the IV fluids.  Patient's current work-up does not show any obvious source of infection.  Patient does have symptoms consistent that of viral  syndrome.  Her COVID-19 test is pending.  At this point time patient is not in DKA.  Patient can be discharged home with outpatient treatment follow-up.      Medical Decision Making:  MDM     Final diagnoses:   Acute viral syndrome   Hyperglycemia due to type 1 diabetes mellitus (HCC)        Disposition:  ED Disposition     ED Disposition   Discharge    Condition   Stable    Comment   --             This medical record created using voice recognition software and may contain unintended errors.    Documentation assistance provided by Bekah Batres acting as scribe for Dr. Harpal Irvin DO. Information recorded by the scribe was done at my direction and has been verified and validated by me.      Bekah Batres  08/17/22 1639       Harpal Irvin DO  08/17/22 2004

## 2022-08-18 ENCOUNTER — TELEPHONE (OUTPATIENT)
Dept: INTERNAL MEDICINE | Facility: CLINIC | Age: 27
End: 2022-08-18

## 2022-08-18 NOTE — DISCHARGE INSTRUCTIONS
Rest at home.  Drink plenty of fluids.  Closely monitor your blood sugar.  Treat your sugar accordingly.  Take Tylenol and/or Motrin as needed for any fevers or body aches.  Follow-up with your COVID-19 test results using the [employer] nicole.  Self isolate until your results are obtained.  Follow your primary care provider in 3 to 5 days if symptoms or not improving.  Return to the ER for any change or worsening symptoms or any concerns at may arise.

## 2022-08-22 ENCOUNTER — EDUCATION (OUTPATIENT)
Dept: DIABETES SERVICES | Facility: HOSPITAL | Age: 27
End: 2022-08-22

## 2022-08-22 ENCOUNTER — OFFICE VISIT (OUTPATIENT)
Dept: INTERNAL MEDICINE | Facility: CLINIC | Age: 27
End: 2022-08-22

## 2022-08-22 VITALS
BODY MASS INDEX: 25.4 KG/M2 | HEIGHT: 68 IN | WEIGHT: 167.6 LBS | TEMPERATURE: 96.6 F | OXYGEN SATURATION: 100 % | HEART RATE: 85 BPM | SYSTOLIC BLOOD PRESSURE: 122 MMHG | DIASTOLIC BLOOD PRESSURE: 64 MMHG

## 2022-08-22 DIAGNOSIS — E10.9 DIABETES MELLITUS TYPE 1, CONTROLLED, WITHOUT COMPLICATIONS: Primary | ICD-10-CM

## 2022-08-22 DIAGNOSIS — E10.9 TYPE 1 DIABETES MELLITUS WITHOUT COMPLICATION: Primary | ICD-10-CM

## 2022-08-22 DIAGNOSIS — E10.65 TYPE 1 DIABETES MELLITUS WITH HYPERGLYCEMIA: ICD-10-CM

## 2022-08-22 DIAGNOSIS — N89.8 VAGINAL DISCHARGE: ICD-10-CM

## 2022-08-22 LAB
ALBUMIN SERPL-MCNC: 4.6 G/DL (ref 3.5–5.2)
ALBUMIN/GLOB SERPL: 1.8 G/DL
ALP SERPL-CCNC: 98 U/L (ref 39–117)
ALT SERPL W P-5'-P-CCNC: 24 U/L (ref 1–33)
ANION GAP SERPL CALCULATED.3IONS-SCNC: 10.6 MMOL/L (ref 5–15)
AST SERPL-CCNC: 30 U/L (ref 1–32)
BASOPHILS # BLD AUTO: 0.07 10*3/MM3 (ref 0–0.2)
BASOPHILS NFR BLD AUTO: 1.5 % (ref 0–1.5)
BILIRUB SERPL-MCNC: 0.7 MG/DL (ref 0–1.2)
BUN SERPL-MCNC: 14 MG/DL (ref 6–20)
BUN/CREAT SERPL: 21.9 (ref 7–25)
CALCIUM SPEC-SCNC: 9.6 MG/DL (ref 8.6–10.5)
CANDIDA SPECIES: NEGATIVE
CHLORIDE SERPL-SCNC: 103 MMOL/L (ref 98–107)
CHOLEST SERPL-MCNC: 146 MG/DL (ref 0–200)
CO2 SERPL-SCNC: 23.4 MMOL/L (ref 22–29)
CREAT SERPL-MCNC: 0.64 MG/DL (ref 0.57–1)
DEPRECATED RDW RBC AUTO: 35.3 FL (ref 37–54)
EGFRCR SERPLBLD CKD-EPI 2021: 123.6 ML/MIN/1.73
EOSINOPHIL # BLD AUTO: 0.12 10*3/MM3 (ref 0–0.4)
EOSINOPHIL NFR BLD AUTO: 2.5 % (ref 0.3–6.2)
ERYTHROCYTE [DISTWIDTH] IN BLOOD BY AUTOMATED COUNT: 11.2 % (ref 12.3–15.4)
GARDNERELLA VAGINALIS: NEGATIVE
GLOBULIN UR ELPH-MCNC: 2.6 GM/DL
GLUCOSE SERPL-MCNC: 184 MG/DL (ref 65–99)
HBA1C MFR BLD: 9.6 % (ref 4.8–5.6)
HCT VFR BLD AUTO: 42.8 % (ref 34–46.6)
HDLC SERPL-MCNC: 62 MG/DL (ref 40–60)
HGB BLD-MCNC: 14.8 G/DL (ref 12–15.9)
IMM GRANULOCYTES # BLD AUTO: 0.02 10*3/MM3 (ref 0–0.05)
IMM GRANULOCYTES NFR BLD AUTO: 0.4 % (ref 0–0.5)
LDLC SERPL CALC-MCNC: 74 MG/DL (ref 0–100)
LDLC/HDLC SERPL: 1.22 {RATIO}
LYMPHOCYTES # BLD AUTO: 2.1 10*3/MM3 (ref 0.7–3.1)
LYMPHOCYTES NFR BLD AUTO: 43.7 % (ref 19.6–45.3)
MCH RBC QN AUTO: 30.2 PG (ref 26.6–33)
MCHC RBC AUTO-ENTMCNC: 34.6 G/DL (ref 31.5–35.7)
MCV RBC AUTO: 87.3 FL (ref 79–97)
MONOCYTES # BLD AUTO: 0.48 10*3/MM3 (ref 0.1–0.9)
MONOCYTES NFR BLD AUTO: 10 % (ref 5–12)
NEUTROPHILS NFR BLD AUTO: 2.02 10*3/MM3 (ref 1.7–7)
NEUTROPHILS NFR BLD AUTO: 41.9 % (ref 42.7–76)
NRBC BLD AUTO-RTO: 0 /100 WBC (ref 0–0.2)
PLATELET # BLD AUTO: 403 10*3/MM3 (ref 140–450)
PMV BLD AUTO: 10.4 FL (ref 6–12)
POTASSIUM SERPL-SCNC: 4.6 MMOL/L (ref 3.5–5.2)
PROT SERPL-MCNC: 7.2 G/DL (ref 6–8.5)
RBC # BLD AUTO: 4.9 10*6/MM3 (ref 3.77–5.28)
SODIUM SERPL-SCNC: 137 MMOL/L (ref 136–145)
T VAGINALIS DNA VAG QL PROBE+SIG AMP: NEGATIVE
TRIGL SERPL-MCNC: 41 MG/DL (ref 0–150)
TSH SERPL DL<=0.05 MIU/L-ACNC: 1.58 UIU/ML (ref 0.27–4.2)
VLDLC SERPL-MCNC: 10 MG/DL (ref 5–40)
WBC NRBC COR # BLD: 4.81 10*3/MM3 (ref 3.4–10.8)

## 2022-08-22 PROCEDURE — 99214 OFFICE O/P EST MOD 30 MIN: CPT | Performed by: PHYSICIAN ASSISTANT

## 2022-08-22 PROCEDURE — 87480 CANDIDA DNA DIR PROBE: CPT | Performed by: PHYSICIAN ASSISTANT

## 2022-08-22 PROCEDURE — 87510 GARDNER VAG DNA DIR PROBE: CPT | Performed by: PHYSICIAN ASSISTANT

## 2022-08-22 PROCEDURE — 87660 TRICHOMONAS VAGIN DIR PROBE: CPT | Performed by: PHYSICIAN ASSISTANT

## 2022-08-22 PROCEDURE — 80050 GENERAL HEALTH PANEL: CPT | Performed by: PHYSICIAN ASSISTANT

## 2022-08-22 PROCEDURE — 80061 LIPID PANEL: CPT | Performed by: PHYSICIAN ASSISTANT

## 2022-08-22 PROCEDURE — 83036 HEMOGLOBIN GLYCOSYLATED A1C: CPT | Performed by: PHYSICIAN ASSISTANT

## 2022-08-22 RX ORDER — FLUCONAZOLE 150 MG/1
TABLET ORAL
Qty: 2 TABLET | Refills: 0 | OUTPATIENT
Start: 2022-08-22 | End: 2022-09-18

## 2022-08-22 NOTE — PROGRESS NOTES
"Chief Complaint  Diabetes (FMLA STD paperwork)    Subjective          Ely Burciaga presents to [facility]  DM1- managed by Endocrinology, she has been set up with a new pump.  Sensor is on order.  She is scheduled to see diabetes educator.  Patient is requesting time off from work in order to focus on her health and make medical appointments to regulate her diabetes.  Patient is requesting FMLA form filled out today.    Peripheral edema-patient has noticed more swelling in her legs worse when she is up standing for a long time.    Vaginal discharge- patient also has noticed vaginal discharge and is concerned about yeast infection likely due to elevated sugars    Needs fmla since visiting the ER on 8/17      Objective   Vital Signs:   /64 (BP Location: Left arm, Patient Position: Sitting, Cuff Size: Adult)   Pulse 85   Temp 96.6 °F (35.9 °C) (Temporal)   Ht 172.7 cm (68\")   Wt 76 kg (167 lb 9.6 oz)   SpO2 100%   BMI 25.48 kg/m²     Physical Exam   Result Review :          Procedures      Assessment and Plan    Diagnoses and all orders for this visit:    1. Type 1 diabetes mellitus without complication (HCC) (Primary)  Assessment & Plan:  Currently uncontrolled, will check labs today and have patient follow up in 1 week for reevaluation.  Continue management with endocrinology.  Discussed with patient the importance of keeping her medical appointments to get better control over chronic issues and prevent comorbid conditions.  Would recommend patient be allotted extra time off from work to make these appointments until diabetes and other conditions are under better control and office visits can be spaced out more.      Orders:  -     CBC & Differential  -     Comprehensive Metabolic Panel  -     Lipid Panel  -     Hemoglobin A1c  -     TSH    2. Vaginal discharge  -     fluconazole (Diflucan) 150 MG tablet; Take 1 tablet (150mg) PO then repeat in 3 days  " Dispense: 2 tablet; Refill: 0  -     Gardnerella vaginalis, Trichomonas vaginalis, Candida albicans, DNA - Swab, Vagina    3. Type 1 diabetes mellitus with hyperglycemia (HCC)  Assessment & Plan:  Currently uncontrolled, will check labs today and have patient follow up in 1 week for reevaluation.  Continue management with endocrinology.  Discussed with patient the importance of keeping her medical appointments to get better control over chronic issues and prevent comorbid conditions.  Would recommend patient be allotted extra time off from work to make these appointments until diabetes and other conditions are under better control and office visits can be spaced out more.                Follow Up   No follow-ups on file.  Patient was given instructions and counseling regarding her condition or for health maintenance advice. Please see specific information pulled into the AVS if appropriate.

## 2022-08-22 NOTE — PROGRESS NOTES
Ely Domíngueze Patrica 26 y.o. presents for insulin pump follow-up.  We discussed the need to count carbs and place in pump.  Calorie Renard book given to patient. [employer] rep called and she will send blood glucose finger stick monitor Kwabena link to patient. Paper work complete and sent to supplier for CGM.  Appointment made for patient follow-up in 2 weeks.  Time started: 2:50    Time ended: 3:15    Alejandra Stewart RN, BSN  08/22/2022

## 2022-08-23 PROBLEM — N89.8 VAGINAL DISCHARGE: Status: ACTIVE | Noted: 2022-08-23

## 2022-08-23 PROBLEM — E10.65 TYPE 1 DIABETES MELLITUS WITH HYPERGLYCEMIA: Status: ACTIVE | Noted: 2021-09-15

## 2022-08-23 NOTE — ASSESSMENT & PLAN NOTE
Currently uncontrolled, will check labs today and have patient follow up in 1 week for reevaluation.  Continue management with endocrinology.  Discussed with patient the importance of keeping her medical appointments to get better control over chronic issues and prevent comorbid conditions.  Would recommend patient be allotted extra time off from work to make these appointments until diabetes and other conditions are under better control and office visits can be spaced out more.

## 2022-08-24 ENCOUNTER — TELEPHONE (OUTPATIENT)
Dept: INTERNAL MEDICINE | Facility: CLINIC | Age: 27
End: 2022-08-24

## 2022-08-24 NOTE — TELEPHONE ENCOUNTER
Caller: Ely Burciaga    Relationship: Self    Best call back number: [phone]    What is the best time to reach you: ANYTIME    Who are you requesting to speak with (clinical staff, provider,  specific staff member): CLINICAL    What was the call regarding: THE PATIENT STATED SHE DOES NOT SEE HER ENDOCRINOLOGIST TO ADJUST HER PUMP ON 09/09/22 AND SHE WOULD LIKE HER FMLA EXTENDED THROUGH 09/09/22 AND A CALL BACK TO CONFIRM    Do you require a callback: YES

## 2022-08-24 NOTE — TELEPHONE ENCOUNTER
Spoke with patient who stated that the [facility] paperwork needs to state she is allowed to take breaks if her blood sugar is high or low to go to the break room. However it should not state that she will get different break times from other coworkers. Patient would also like the form to state that she can take off for Diabetic appointments and if her blood is too high or too low for an extended time. Patients next appointment with endocrinology to fix her pump settings is on 9/8/2022, patient would like a new note stating she can be off work from 8/17/2022- 9/9/2022. Is it okay to write this note?

## 2022-08-25 ENCOUNTER — DOCUMENTATION (OUTPATIENT)
Dept: INTERNAL MEDICINE | Facility: CLINIC | Age: 27
End: 2022-08-25

## 2022-08-25 NOTE — TELEPHONE ENCOUNTER
I have finished filling out FMLA paperwork and just printed a note describing patient's medical needs.  This info will be in my sign box and patient will also have access to the note via [employer].  We will have the front fax this information but patient is welcome to come to the office to pick it up to have for her records as well.

## 2022-08-29 ENCOUNTER — OFFICE VISIT (OUTPATIENT)
Dept: INTERNAL MEDICINE | Facility: CLINIC | Age: 27
End: 2022-08-29

## 2022-08-29 VITALS
HEIGHT: 68 IN | WEIGHT: 168.4 LBS | DIASTOLIC BLOOD PRESSURE: 76 MMHG | HEART RATE: 96 BPM | OXYGEN SATURATION: 98 % | TEMPERATURE: 97.6 F | BODY MASS INDEX: 25.52 KG/M2 | SYSTOLIC BLOOD PRESSURE: 116 MMHG

## 2022-08-29 DIAGNOSIS — R30.0 DYSURIA: Primary | ICD-10-CM

## 2022-08-29 DIAGNOSIS — E10.65 TYPE 1 DIABETES MELLITUS WITH HYPERGLYCEMIA: ICD-10-CM

## 2022-08-29 DIAGNOSIS — L98.9 SKIN LESION: ICD-10-CM

## 2022-08-29 LAB
BACTERIA UR QL AUTO: ABNORMAL /HPF
BILIRUB UR QL STRIP: ABNORMAL
CLARITY UR: ABNORMAL
COLOR UR: ABNORMAL
GLUCOSE UR STRIP-MCNC: ABNORMAL MG/DL
HGB UR QL STRIP.AUTO: ABNORMAL
HYALINE CASTS UR QL AUTO: ABNORMAL /LPF
KETONES UR QL STRIP: ABNORMAL
LEUKOCYTE ESTERASE UR QL STRIP.AUTO: NEGATIVE
NITRITE UR QL STRIP: POSITIVE
PH UR STRIP.AUTO: 5.5 [PH] (ref 5–8)
PROT UR QL STRIP: ABNORMAL
RBC # UR STRIP: ABNORMAL /HPF
REF LAB TEST METHOD: ABNORMAL
SP GR UR STRIP: >=1.03 (ref 1–1.03)
SQUAMOUS #/AREA URNS HPF: ABNORMAL /HPF
UROBILINOGEN UR QL STRIP: ABNORMAL
WBC # UR STRIP: ABNORMAL /HPF

## 2022-08-29 PROCEDURE — 87186 SC STD MICRODIL/AGAR DIL: CPT | Performed by: PHYSICIAN ASSISTANT

## 2022-08-29 PROCEDURE — 87086 URINE CULTURE/COLONY COUNT: CPT | Performed by: PHYSICIAN ASSISTANT

## 2022-08-29 PROCEDURE — 99213 OFFICE O/P EST LOW 20 MIN: CPT | Performed by: PHYSICIAN ASSISTANT

## 2022-08-29 PROCEDURE — 81001 URINALYSIS AUTO W/SCOPE: CPT | Performed by: PHYSICIAN ASSISTANT

## 2022-08-29 RX ORDER — MUPIROCIN CALCIUM 20 MG/G
1 CREAM TOPICAL 3 TIMES DAILY
Qty: 15 G | Refills: 0 | Status: SHIPPED | OUTPATIENT
Start: 2022-08-29 | End: 2022-09-03

## 2022-08-29 NOTE — ASSESSMENT & PLAN NOTE
Will check urine and send for culture.  Continue to push water and watch for new or worsening symptoms.
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
## 2022-08-30 RX ORDER — NITROFURANTOIN 25; 75 MG/1; MG/1
100 CAPSULE ORAL 2 TIMES DAILY
Qty: 10 CAPSULE | Refills: 0 | Status: SHIPPED | OUTPATIENT
Start: 2022-08-30 | End: 2022-09-04

## 2022-08-31 LAB — BACTERIA SPEC AEROBE CULT: ABNORMAL

## 2022-09-08 ENCOUNTER — EDUCATION (OUTPATIENT)
Dept: DIABETES SERVICES | Facility: HOSPITAL | Age: 27
End: 2022-09-08

## 2022-09-08 DIAGNOSIS — E10.9 DIABETES MELLITUS TYPE 1, CONTROLLED, WITHOUT COMPLICATIONS: Primary | ICD-10-CM

## 2022-09-08 NOTE — PROGRESS NOTES
Patient presented for insulin pump follow-up.  We reviewed basic insulin pump therapy.  We discussed the need to try to place carbohydrates in pump accurately, monitoring blood glucose, changing pump sites every 3 days and filling cartridges properly.  Patient was encouraged to call the [facility] staff with any questions or concerns.  .

## 2022-09-10 RX ORDER — INSULIN LISPRO 100 [IU]/ML
INJECTION, SOLUTION INTRAVENOUS; SUBCUTANEOUS
Qty: 30 ML | Refills: 2 | Status: SHIPPED | OUTPATIENT
Start: 2022-09-10 | End: 2022-12-02

## 2022-09-21 ENCOUNTER — OFFICE VISIT (OUTPATIENT)
Dept: INTERNAL MEDICINE | Facility: CLINIC | Age: 27
End: 2022-09-21

## 2022-09-21 ENCOUNTER — APPOINTMENT (OUTPATIENT)
Dept: DIABETES SERVICES | Facility: HOSPITAL | Age: 27
End: 2022-09-21

## 2022-09-21 VITALS
TEMPERATURE: 98.2 F | DIASTOLIC BLOOD PRESSURE: 74 MMHG | OXYGEN SATURATION: 99 % | SYSTOLIC BLOOD PRESSURE: 126 MMHG | HEIGHT: 68 IN | HEART RATE: 94 BPM | WEIGHT: 170.5 LBS | BODY MASS INDEX: 25.84 KG/M2

## 2022-09-21 DIAGNOSIS — F33.1 MAJOR DEPRESSIVE DISORDER, RECURRENT, MODERATE: ICD-10-CM

## 2022-09-21 DIAGNOSIS — R05.9 COUGH: ICD-10-CM

## 2022-09-21 DIAGNOSIS — U07.1 COVID-19 VIRUS DETECTED: ICD-10-CM

## 2022-09-21 DIAGNOSIS — E10.65 TYPE 1 DIABETES MELLITUS WITH HYPERGLYCEMIA: Primary | ICD-10-CM

## 2022-09-21 PROCEDURE — 99214 OFFICE O/P EST MOD 30 MIN: CPT | Performed by: INTERNAL MEDICINE

## 2022-09-21 RX ORDER — VALACYCLOVIR HYDROCHLORIDE 500 MG/1
500 TABLET, FILM COATED ORAL 2 TIMES DAILY
Qty: 90 TABLET | Refills: 1 | Status: SHIPPED | OUTPATIENT
Start: 2022-09-21 | End: 2022-12-16

## 2022-09-21 RX ORDER — BUPROPION HYDROCHLORIDE 150 MG/1
150 TABLET ORAL DAILY
Qty: 90 TABLET | Refills: 1 | Status: SHIPPED | OUTPATIENT
Start: 2022-09-21 | End: 2022-12-12

## 2022-09-21 RX ORDER — LIDOCAINE 50 MG/G
1 OINTMENT TOPICAL
Qty: 50 G | Refills: 2 | Status: SHIPPED | OUTPATIENT
Start: 2022-09-21

## 2022-09-21 RX ORDER — VALACYCLOVIR HYDROCHLORIDE 1 G/1
1000 TABLET, FILM COATED ORAL 2 TIMES DAILY
Qty: 20 TABLET | Refills: 0 | Status: SHIPPED | OUTPATIENT
Start: 2022-09-21 | End: 2022-09-21

## 2022-09-21 NOTE — PROGRESS NOTES
"Chief Complaint  Follow-up (Exposure to covid, cough, sneeze, body aches, fever, congestion )    Subjective          Ely Tessie Burciaga presents to [facility]  History of Present Illness     States that she started feeling bad about a week ago  Progressed to head congestion, cough, etc  Doing better now    Mostly needing paperwork for FMLA    She is getting better control of her DM I and she has been working with [employer] to get apropriate equipment    She is still having some high and low swings at work but it is better    Stress- would like to try something to help with depression she is a bit better but still feeling down on a regular basis and states that he rmom and sister have done well with wellbutrin      Objective   Vital Signs:   /74 (BP Location: Left arm, Patient Position: Sitting, Cuff Size: Adult)   Pulse 94   Temp 98.2 °F (36.8 °C)   Ht 172.7 cm (68\")   Wt 77.3 kg (170 lb 8 oz)   SpO2 99%   BMI 25.92 kg/m²     Physical Exam  Vitals reviewed.   Constitutional:       Appearance: Normal appearance. She is well-developed.   HENT:      Head: Normocephalic and atraumatic.      Right Ear: External ear normal.      Left Ear: External ear normal.   Eyes:      Conjunctiva/sclera: Conjunctivae normal.      Pupils: Pupils are equal, round, and reactive to light.   Cardiovascular:      Rate and Rhythm: Normal rate and regular rhythm.      Heart sounds: No murmur heard.    No friction rub. No gallop.   Pulmonary:      Effort: Pulmonary effort is normal.      Breath sounds: Normal breath sounds. No wheezing or rhonchi.   Skin:     General: Skin is warm and dry.   Neurological:      Mental Status: She is alert and oriented to person, place, and time.   Psychiatric:         Mood and Affect: Affect normal.         Behavior: Behavior normal.         Thought Content: Thought content normal.        Result Review :       Common labs    Common Labs 7/14/22 8/17/22 " 8/17/22 8/22/22 8/22/22 8/22/22 8/22/22     1519 1519 1326 1326 1326 1326   Glucose   415 (A)    184 (A)   BUN   18    14   Creatinine   0.77    0.64   Sodium   131 (A)    137   Potassium   4.4    4.6   Chloride   96 (A)    103   Calcium   10.0    9.6   Albumin   4.90    4.60   Total Bilirubin   0.7    0.7   Alkaline Phosphatase   128 (A)    98   AST (SGOT)   18    30   ALT (SGPT)   17    24   WBC  5.88  4.81      Hemoglobin  13.9  14.8      Hematocrit  38.1  42.8      Platelets  336  403      Total Cholesterol      146    Triglycerides      41    HDL Cholesterol      62 (A)    LDL Cholesterol       74    Hemoglobin A1C 9.3    9.60 (A)     (A) Abnormal value              Results for orders placed or performed during the hospital encounter of 09/18/22   POCT SARS-CoV-2 Antigen GAB   ([facility])    Specimen: Swab   Result Value Ref Range    SARS Antigen Detected (A) Not Detected, Presumptive Negative    Internal Control Passed Passed    Lot Number [id number]     Expiration Date 70,823    POC Influenza A/B    Specimen: Swab   Result Value Ref Range    Rapid Influenza A Ag Negative Negative    Rapid Influenza B Ag Negative Negative    Internal Control Passed Passed    Lot Number [id number]     Expiration Date 11,624    POC Rapid Strep A    Specimen: Swab   Result Value Ref Range    Rapid Strep A Screen Negative Negative, VALID, INVALID, Not Performed    Internal Control Passed Passed    Lot Number [id number]     Expiration Date 123,123             Procedures        Assessment and Plan    Diagnoses and all orders for this visit:    1. Type 1 diabetes mellitus with hyperglycemia (HCC) (Primary)  Comments:  will complete paperwork to allow her to cont to work but gradually get back into it as she gets better control of sugars    2. Cough  -     Cancel: POCT SARS-CoV-2 Antigen GAB + Flu    3. Major depressive disorder, recurrent, moderate (HCC)  Comments:  will try wellbutrin, discussed risks and benefits    4.  COVID-19 virus detected  Comments:  expectant management    Other orders  -     buPROPion XL (Wellbutrin XL) 150 MG 24 hr tablet; Take 1 tablet by mouth Daily.  Dispense: 90 tablet; Refill: 1  -     Discontinue: valACYclovir (Valtrex) 1000 MG tablet; Take 1 tablet by mouth 2 (Two) Times a Day for 10 days.  Dispense: 20 tablet; Refill: 0  -     lidocaine (XYLOCAINE) 5 % ointment; Apply 1 application topically to the appropriate area as directed Every 2 (Two) Hours As Needed for Mild Pain.  Dispense: 50 g; Refill: 2  -     valACYclovir (Valtrex) 500 MG tablet; Take 1 tablet by mouth 2 (Two) Times a Day.  Dispense: 90 tablet; Refill: 1                  Follow Up   No follow-ups on file.  Patient was given instructions and counseling regarding her condition or for health maintenance advice. Please see specific information pulled into the AVS if appropriate.

## 2022-09-26 ENCOUNTER — TELEPHONE (OUTPATIENT)
Dept: INTERNAL MEDICINE | Facility: CLINIC | Age: 27
End: 2022-09-26

## 2022-10-11 ENCOUNTER — OFFICE VISIT (OUTPATIENT)
Dept: INTERNAL MEDICINE | Facility: CLINIC | Age: 27
End: 2022-10-11

## 2022-10-11 VITALS
SYSTOLIC BLOOD PRESSURE: 116 MMHG | OXYGEN SATURATION: 100 % | BODY MASS INDEX: 27.67 KG/M2 | DIASTOLIC BLOOD PRESSURE: 62 MMHG | TEMPERATURE: 97.6 F | HEART RATE: 79 BPM | WEIGHT: 182 LBS

## 2022-10-11 DIAGNOSIS — E10.9 TYPE 1 DIABETES MELLITUS WITHOUT COMPLICATION: Primary | ICD-10-CM

## 2022-10-11 PROBLEM — F32.A DEPRESSION: Status: RESOLVED | Noted: 2021-09-15 | Resolved: 2022-10-11

## 2022-10-11 PROCEDURE — 99212 OFFICE O/P EST SF 10 MIN: CPT | Performed by: INTERNAL MEDICINE

## 2022-10-11 NOTE — PROGRESS NOTES
Chief Complaint  FMLA (DM )    Subjective          Ely Tessie Burciaga presents to [facility]  History of Present Illness     Needs FMLA paperwork completed  She is continuing to work with our diabetes team for her pump management  States that she feels like it is getting better however still having some issues    Highest- 426  Lowest- 32  Average- 190-200    Has completely recovered from COVID and feeling significantly better now    Most of the visit was spent reviewing her FMLA paperwork and discussing current needs for this    Objective   Vital Signs:   /62 (BP Location: Right arm, Patient Position: Sitting, Cuff Size: Adult)   Pulse 79   Temp 97.6 °F (36.4 °C)   Wt 82.6 kg (182 lb)   SpO2 100%   BMI 27.67 kg/m²     Physical Exam  Vitals reviewed.   Constitutional:       Appearance: Normal appearance. She is well-developed.   HENT:      Head: Normocephalic and atraumatic.      Right Ear: External ear normal.      Left Ear: External ear normal.   Eyes:      Conjunctiva/sclera: Conjunctivae normal.      Pupils: Pupils are equal, round, and reactive to light.   Cardiovascular:      Rate and Rhythm: Normal rate and regular rhythm.      Heart sounds: No murmur heard.    No friction rub. No gallop.   Pulmonary:      Effort: Pulmonary effort is normal.      Breath sounds: Normal breath sounds. No wheezing or rhonchi.   Skin:     General: Skin is warm and dry.   Neurological:      Mental Status: She is alert and oriented to person, place, and time.   Psychiatric:         Mood and Affect: Affect normal.         Behavior: Behavior normal.         Thought Content: Thought content normal.        Result Review :       Common labs    Common Labs 7/14/22 8/17/22 8/17/22 8/22/22 8/22/22 8/22/22 8/22/22     1519 1519 1326 1326 1326 1326   Glucose   415 (A)    184 (A)   BUN   18    14   Creatinine   0.77    0.64   Sodium   131 (A)    137   Potassium   4.4    4.6   Chloride    96 (A)    103   Calcium   10.0    9.6   Albumin   4.90    4.60   Total Bilirubin   0.7    0.7   Alkaline Phosphatase   128 (A)    98   AST (SGOT)   18    30   ALT (SGPT)   17    24   WBC  5.88  4.81      Hemoglobin  13.9  14.8      Hematocrit  38.1  42.8      Platelets  336  403      Total Cholesterol      146    Triglycerides      41    HDL Cholesterol      62 (A)    LDL Cholesterol       74    Hemoglobin A1C 9.3    9.60 (A)     (A) Abnormal value              Results for orders placed or performed during the hospital encounter of 09/18/22   POCT SARS-CoV-2 Antigen GAB   ([facility])    Specimen: Swab   Result Value Ref Range    SARS Antigen Detected (A) Not Detected, Presumptive Negative    Internal Control Passed Passed    Lot Number [id number]     Expiration Date 70,823    POC Influenza A/B    Specimen: Swab   Result Value Ref Range    Rapid Influenza A Ag Negative Negative    Rapid Influenza B Ag Negative Negative    Internal Control Passed Passed    Lot Number [id number]     Expiration Date 11,624    POC Rapid Strep A    Specimen: Swab   Result Value Ref Range    Rapid Strep A Screen Negative Negative, VALID, INVALID, Not Performed    Internal Control Passed Passed    Lot Number [id number]     Expiration Date 123,123             Procedures        Assessment and Plan    Diagnoses and all orders for this visit:    1. Type 1 diabetes mellitus without complication (HCC) (Primary)  Comments:  comPleated FMLA paperwork continue to work with our diabetic group to get better control of current sugar levels                  Follow Up   No follow-ups on file.  Patient was given instructions and counseling regarding her condition or for health maintenance advice. Please see specific information pulled into the AVS if appropriate.

## 2022-10-11 NOTE — PROGRESS NOTES
Chief Complaint  Diabetes (Follow up, med mgt, pump e-jefry )    Referred By: Shilpa Callaway MD    Subjective          Ely Burciaga presents to [facility] for insulin pump management    History of Present Illness    Visit type:  follow-up  Diabetes type:  Type 1  Current diabetes status/concerns/issues: She was started on an upgraded Medtronic pump on 7/26/2022.  She is scheduled with the diabetes nurse educator tomorrow to add the CGM for use with her pump.  Other health concerns: She was positive for COVID on 9/18/2022.  She said she experienced cough and congestion for approximately 2 weeks  Diabetes symptoms:    Polyuria: No   Polydipsia: No   Polyphagia: No   Blurred vision: No   Excessive fatigue: No   Diabetes complications:   Neuro: Neuropathy with complaints of numbness and tingling in her feet and lower extremities  Renal: None  Eyes: None  Amputation/Wounds: None  GI: None  Cardiovascular: None  ED: N/A   Other: None  Hypoglycemia:  Level 1 hypoglycemia (54 mg/dL - 70 mg/dL); Frequency - The pump report indicates frequent episodes of hypoglycemia and Level 2 (less than 54 mg/dL); Frequency - She is having some glucose levels in the 40s  Hypoglycemia Symptoms:  shaking/tremors  Current diabetes treatment:  Medtronic insulin pump using Humalog insulin.  She does not enter any glucose levels or carbohydrates into her pump.  She will take a random insulin bolus only   Blood glucose device:  Meter  Blood glucose monitoring frequency:  1 - 2  Blood glucose range/average:  The 14-day pump report shows a glucose average of 329 mg/dL ±193 mg/dL glucose levels are widely fluctuating from lows in the 40s to highs greater than 400.  The report indicates glucose levels as high as 561 and as low as 42.  She is rarely entering carbohydrates into her pump.  The patient states that she does not enter her normal glucose levels into her pump on most occasions.    Diet:  Limits high  carb/sweet foods, Avoids sugary drinks  Activity/Exercise:  She is active with her work    Past Medical History:   Diagnosis Date   • Anxiety    • Bipolar disorder (HCC)    • Depression    • Diabetes (HCC)    • Diabetes mellitus type I (HCC)    • STD exposure      Past Surgical History:   Procedure Laterality Date   •  SECTION       Family History   Problem Relation Age of Onset   • Heart disease Other    • Prostate cancer Other      Social History     Socioeconomic History   • Marital status: Single   • Number of children: 1   Tobacco Use   • Smoking status: Never   • Smokeless tobacco: Never   Vaping Use   • Vaping Use: Every day   • Substances: Nicotine, Flavoring   • Devices: Disposable   Substance and Sexual Activity   • Alcohol use: Not Currently   • Drug use: Never     Comment: Denies substance abuse   • Sexual activity: Yes     Partners: Male     No Known Allergies    Current Outpatient Medications:   •  Accu-Chek FastClix Lancets misc, 1 each 4 (Four) Times a Day., Disp: 102 each, Rfl: 11  •  albuterol sulfate  (90 Base) MCG/ACT inhaler, Inhale 2 puffs Every 4 (Four) Hours As Needed for Shortness of Air., Disp: 1 g, Rfl: 0  •  buPROPion XL (Wellbutrin XL) 150 MG 24 hr tablet, Take 1 tablet by mouth Daily., Disp: 90 tablet, Rfl: 1  •  glucose blood test strip, Test blood glucose 4 times a day and as needed, Disp: 200 each, Rfl: 5  •  HumaLOG 100 UNIT/ML injection, USE IN PUMP. MAXIMUM DAILY DOSE  UNITS DAILY, Disp: 30 mL, Rfl: 2  •  Insulin Infusion Pump Supplies (MiniMed [place] Infusion Set) misc, 10 each Every 3 (Three) Days., Disp: 30 each, Rfl: 0  •  levonorgestrel (Mirena, 52 MG,) 20 MCG/24HR IUD, , Disp: , Rfl:   •  lidocaine (XYLOCAINE) 5 % ointment, Apply 1 application topically to the appropriate area as directed Every 2 (Two) Hours As Needed for Mild Pain., Disp: 50 g, Rfl: 2  •  mupirocin (BACTROBAN) 2 % ointment, APPLY 1 APPLICATION TOPICALLY TO THE APPROPRIATE AREA AS DIRECTED  "3 (THREE) TIMES A DAY FOR 5 DAYS., Disp: , Rfl:   •  valACYclovir (Valtrex) 500 MG tablet, Take 1 tablet by mouth 2 (Two) Times a Day., Disp: 90 tablet, Rfl: 1    Review of Systems   Constitutional: Positive for unexpected weight gain (She had a 9 pound weight gain). Negative for activity change, appetite change, fatigue, fever and unexpected weight loss.   HENT: Negative for congestion, ear pain, facial swelling, hearing loss, sore throat and tinnitus.    Eyes: Negative for blurred vision, double vision, redness and visual disturbance.   Respiratory: Negative for cough, shortness of breath and wheezing.    Cardiovascular: Negative for chest pain, palpitations and leg swelling.   Gastrointestinal: Negative for abdominal distention, constipation, diarrhea, nausea, vomiting, GERD and indigestion.   Endocrine: Negative for polydipsia, polyphagia and polyuria.   Genitourinary: Negative for difficulty urinating, frequency and urgency.   Musculoskeletal: Negative for back pain, gait problem and myalgias.   Skin: Negative for rash, skin lesions and wound.   Neurological: Negative for seizures, speech difficulty, weakness, headache and confusion.   Psychiatric/Behavioral: Positive for sleep disturbance, depressed mood and stress. The patient is nervous/anxious.         Objective     Vitals:    10/12/22 1421   BP: 122/75   BP Location: Left arm   Patient Position: Sitting   Cuff Size: Adult   Pulse: 83   Temp: 97.7 °F (36.5 °C)   SpO2: 98%   Weight: 80.8 kg (178 lb 2.1 oz)   Height: 172.7 cm (68\")   PainSc: 0-No pain     Body mass index is 27.08 kg/m².    Physical Exam  Constitutional:       Appearance: Normal appearance.      Comments: Overweight with BMI of 27.08   HENT:      Head: Normocephalic and atraumatic.      Right Ear: External ear normal.      Left Ear: External ear normal.      Nose: Nose normal.   Eyes:      Extraocular Movements: Extraocular movements intact.      Conjunctiva/sclera: Conjunctivae normal. "   Pulmonary:      Effort: Pulmonary effort is normal.   Musculoskeletal:         General: Normal range of motion.      Cervical back: Normal range of motion.   Skin:     General: Skin is warm and dry.   Neurological:      General: No focal deficit present.      Mental Status: She is alert and oriented to person, place, and time. Mental status is at baseline.   Psychiatric:         Mood and Affect: Mood normal.         Behavior: Behavior normal.         Thought Content: Thought content normal.         Judgment: Judgment normal.         Result Review :   The following data was reviewed by: JI Lilly on 10/12/2022:    Most Recent A1C    HGBA1C Most Recent 8/22/22   Hemoglobin A1C 9.60 (A)   (A) Abnormal value              A1C Last 3 Results    HGBA1C Last 3 Results 7/14/22 8/22/22   Hemoglobin A1C 9.3 9.60 (A)   (A) Abnormal value            Her most recent A1c was collected on 8/22/2022 and was 9.6% indicating uncontrolled type 1 diabetes.  This is up from the prior result of 9.3 collected in July of this year      Creatinine   Date Value Ref Range Status   08/22/2022 0.64 0.57 - 1.00 mg/dL Final   08/17/2022 0.77 0.57 - 1.00 mg/dL Final     eGFR   Date Value Ref Range Status   08/22/2022 123.6 >60.0 mL/min/1.73 Final     Comment:     National Kidney Foundation and American Society of Nephrology (ASN) Task Force recommended calculation based on the Chronic Kidney Disease Epidemiology Collaboration (CKD-EPI) equation refit without adjustment for race.   08/17/2022 107.9 >60.0 mL/min/1.73 Final     Comment:     National Kidney Foundation and American Society of Nephrology (ASN) Task Force recommended calculation based on the Chronic Kidney Disease Epidemiology Collaboration (CKD-EPI) equation refit without adjustment for race.     Labs collected on 8/23/2022 show normal renal function    Total Cholesterol   Date Value Ref Range Status   08/22/2022 146 0 - 200 mg/dL Final   09/15/2021 147 0 - 200 mg/dL  Final     Triglycerides   Date Value Ref Range Status   08/22/2022 41 0 - 150 mg/dL Final   09/15/2021 66 0 - 150 mg/dL Final     HDL Cholesterol   Date Value Ref Range Status   08/22/2022 62 (H) 40 - 60 mg/dL Final   09/15/2021 61 (H) 40 - 60 mg/dL Final     LDL Cholesterol    Date Value Ref Range Status   08/22/2022 74 0 - 100 mg/dL Final   09/15/2021 73 0 - 100 mg/dL Final     Lipid panel collected on 8/22/2022 is within normal limits            Assessment: Her A1c has gone up.  Her pump report indicates widely fluctuating glucose levels.  Glucose levels are markedly elevated above 400 on multiple occasions.  As she is failing to enter carbohydrates into her pump which is most likely 1 source of the hyperglycemia.  She is also having some significant hypoglycemia at times.  In careful review of the report it looks like the patient will take a bolus to correct for carbohydrates and glucose level and then her glucose level will go too low.  She was advised if she would focus on entering her carbohydrates into her pump she could prevent some of the markedly elevated glucose levels and this would reduce the bolus requirements and risk for hypoglycemia      Diagnoses and all orders for this visit:    1. Uncontrolled type 1 diabetes mellitus with hyperglycemia (HCC) (Primary)    2. Type 1 diabetes mellitus with diabetic neuropathy (HCC)    3. Overweight (BMI 25.0-29.9)    4. Insulin pump in place    Other orders  -     glucose blood test strip; Test blood glucose 4 times a day and as needed  Dispense: 200 each; Refill: 5        Plan: We made no changes to her pump settings today.  She is scheduled with the diabetes nurse educator to add on the continuous glucose sensor.  She will then be seen in this office within 10 days after that appointment and we will reevaluate her glucose levels with the sensor data.  Changes to her pump settings will be made at that time.  In the meantime she is to focus on entering  carbohydrates at every meal.    The patient will monitor her blood glucose levels 3-4 times a day.  If she develops problematic hyperglycemia or hypoglycemia or adverse drug reactions, she will contact the office for further instructions.        Follow Up     Return for Patient is already scheduled for follow-up this month.    Patient was given instructions and counseling regarding her condition or for health maintenance advice. Please see specific information pulled into the AVS if appropriate.     Caro Singh, JI  10/12/2022      Dictated Utilizing Dragon Dictation.  Please note that portions of this note were completed with a voice recognition program.  Part of this note may be an electronic transcription/translation of spoken language to printed text using the Dragon Dictation System.

## 2022-10-12 ENCOUNTER — OFFICE VISIT (OUTPATIENT)
Dept: DIABETES SERVICES | Facility: HOSPITAL | Age: 27
End: 2022-10-12

## 2022-10-12 VITALS
OXYGEN SATURATION: 98 % | HEIGHT: 68 IN | HEART RATE: 83 BPM | BODY MASS INDEX: 27 KG/M2 | TEMPERATURE: 97.7 F | SYSTOLIC BLOOD PRESSURE: 122 MMHG | DIASTOLIC BLOOD PRESSURE: 75 MMHG | WEIGHT: 178.13 LBS

## 2022-10-12 DIAGNOSIS — Z96.41 INSULIN PUMP IN PLACE: ICD-10-CM

## 2022-10-12 DIAGNOSIS — E10.65 UNCONTROLLED TYPE 1 DIABETES MELLITUS WITH HYPERGLYCEMIA: Primary | ICD-10-CM

## 2022-10-12 DIAGNOSIS — E10.40 TYPE 1 DIABETES MELLITUS WITH DIABETIC NEUROPATHY: ICD-10-CM

## 2022-10-12 DIAGNOSIS — E66.3 OVERWEIGHT (BMI 25.0-29.9): ICD-10-CM

## 2022-10-12 PROCEDURE — 99213 OFFICE O/P EST LOW 20 MIN: CPT | Performed by: NURSE PRACTITIONER

## 2022-10-12 PROCEDURE — G0463 HOSPITAL OUTPT CLINIC VISIT: HCPCS | Performed by: NURSE PRACTITIONER

## 2022-10-13 ENCOUNTER — TELEPHONE (OUTPATIENT)
Dept: DIABETES SERVICES | Facility: HOSPITAL | Age: 27
End: 2022-10-13

## 2022-10-13 ENCOUNTER — EDUCATION (OUTPATIENT)
Dept: DIABETES SERVICES | Facility: HOSPITAL | Age: 27
End: 2022-10-13

## 2022-10-13 NOTE — TELEPHONE ENCOUNTER
Ely Burciaga Key: [id number] - PA Case ID: [id number] - Rx #: [id number]      CONTOUR STRIPS 10-13-22

## 2022-10-19 ENCOUNTER — EDUCATION (OUTPATIENT)
Dept: DIABETES SERVICES | Facility: HOSPITAL | Age: 27
End: 2022-10-19

## 2022-10-19 DIAGNOSIS — E10.9 DIABETES MELLITUS TYPE 1, CONTROLLED, WITHOUT COMPLICATIONS: Primary | ICD-10-CM

## 2022-10-19 NOTE — PROGRESS NOTES
"Chief Complaint  No chief complaint on file.    Referred By: Shilpa Callaway MD    Subjective     {Problem List  Visit Diagnosis   Encounters  Notes  Medications  Labs  Result Review Imaging  Media :23}     Ely Burciaga presents to [facility] for insulin pump management    History of Present Illness    Visit type:  follow-up  Diabetes type:  Type 1  Current diabetes status/concerns/issues:  ***  Other health concerns: ***  Diabetes symptoms:    Polyuria: {Yes No:87717::\"No\"}   Polydipsia: {Yes No:28243::\"No\"}   Polyphagia: {Yes No:91268::\"No\"}   Blurred vision: {Yes No:71630::\"No\"}   Excessive fatigue: {Yes No:40071::\"No\"}  Diabetes complications:   Neuro: Neuropathy with complaints of numbness and tingling in her feet and lower extremities  Renal: None  Eyes: None  Amputation/Wounds: None  GI: None  Cardiovascular: None  ED: N/A              Other: None  Hypoglycemia:  Level 1 hypoglycemia (54 mg/dL - 70 mg/dL); Frequency - ***  Hypoglycemia Symptoms:  shaking/tremors  Current diabetes treatment:  ***Medtronic insulin pump using Humalog insulin.  She does not enter any glucose levels or carbohydrates into her pump.  She will take a random insulin bolus only   Blood glucose device:  Meter  Blood glucose monitoring frequency:  1 - 2  Blood glucose range/average:  ***  Diet:  {Diabetes Diet Plan:65933}  Activity/Exercise:  {DM Physical Activity:55519}    Past Medical History:   Diagnosis Date   • Anxiety    • Bipolar disorder (HCC)    • Depression    • Diabetes (HCC)    • Diabetes mellitus type I (HCC)    • STD exposure      Past Surgical History:   Procedure Laterality Date   •  SECTION       Family History   Problem Relation Age of Onset   • Heart disease Other    • Prostate cancer Other      Social History     Socioeconomic History   • Marital status: Single   • Number of children: 1   Tobacco Use   • Smoking status: Never   • Smokeless tobacco: Never   Vaping " Use   • Vaping Use: Every day   • Substances: Nicotine, Flavoring   • Devices: Disposable   Substance and Sexual Activity   • Alcohol use: Not Currently   • Drug use: Never     Comment: Denies substance abuse   • Sexual activity: Yes     Partners: Male     No Known Allergies    Current Outpatient Medications:   •  Accu-Chek FastClix Lancets misc, 1 each 4 (Four) Times a Day., Disp: 102 each, Rfl: 11  •  albuterol sulfate  (90 Base) MCG/ACT inhaler, Inhale 2 puffs Every 4 (Four) Hours As Needed for Shortness of Air., Disp: 1 g, Rfl: 0  •  buPROPion XL (Wellbutrin XL) 150 MG 24 hr tablet, Take 1 tablet by mouth Daily., Disp: 90 tablet, Rfl: 1  •  glucose blood test strip, Test blood glucose 4 times a day and as needed, Disp: 200 each, Rfl: 5  •  HumaLOG 100 UNIT/ML injection, USE IN PUMP. MAXIMUM DAILY DOSE  UNITS DAILY, Disp: 30 mL, Rfl: 2  •  Insulin Infusion Pump Supplies (MiniMed Ashvin Infusion Set) misc, 10 each Every 3 (Three) Days., Disp: 30 each, Rfl: 0  •  levonorgestrel (Mirena, 52 MG,) 20 MCG/24HR IUD, , Disp: , Rfl:   •  lidocaine (XYLOCAINE) 5 % ointment, Apply 1 application topically to the appropriate area as directed Every 2 (Two) Hours As Needed for Mild Pain., Disp: 50 g, Rfl: 2  •  mupirocin (BACTROBAN) 2 % ointment, APPLY 1 APPLICATION TOPICALLY TO THE APPROPRIATE AREA AS DIRECTED 3 (THREE) TIMES A DAY FOR 5 DAYS., Disp: , Rfl:   •  valACYclovir (Valtrex) 500 MG tablet, Take 1 tablet by mouth 2 (Two) Times a Day., Disp: 90 tablet, Rfl: 1    Review of Systems     Objective     There were no vitals filed for this visit.  There is no height or weight on file to calculate BMI.    Physical Exam    Result Review :{Labs  Result Review  Imaging  Med Tab  Media :23}   The following data was reviewed by: Jeana Andre MA on 10/20/2022:    Most Recent A1C    HGBA1C Most Recent 8/22/22   Hemoglobin A1C 9.60 (A)   (A) Abnormal value              A1C Last 3 Results    HGBA1C Last 3 Results  7/14/22 8/22/22   Hemoglobin A1C 9.3 9.60 (A)   (A) Abnormal value            ***    Glucose   Date Value Ref Range Status   08/17/2022 285 (H) 70 - 99 mg/dL Final     Comment:     Serial Number: [id number]:  [id number]   11/22/2020 213 (A) 70 - 99 mg/dL Final     ***    Creatinine   Date Value Ref Range Status   08/22/2022 0.64 0.57 - 1.00 mg/dL Final   08/17/2022 0.77 0.57 - 1.00 mg/dL Final     eGFR   Date Value Ref Range Status   08/22/2022 123.6 >60.0 mL/min/1.73 Final     Comment:     National Kidney Foundation and American Society of Nephrology (ASN) Task Force recommended calculation based on the Chronic Kidney Disease Epidemiology Collaboration (CKD-EPI) equation refit without adjustment for race.   08/17/2022 107.9 >60.0 mL/min/1.73 Final     Comment:     National Kidney Foundation and American Society of Nephrology (ASN) Task Force recommended calculation based on the Chronic Kidney Disease Epidemiology Collaboration (CKD-EPI) equation refit without adjustment for race.     ***    No results found for: MICROALBUR  No results found for: CREATININEUR  Microalbumin/Creatinine Ratio   Date Value Ref Range Status   07/29/2019 6.8 0.0 - 35.0 mg/g[Cre] Final     ***    Total Cholesterol   Date Value Ref Range Status   08/22/2022 146 0 - 200 mg/dL Final   09/15/2021 147 0 - 200 mg/dL Final     Triglycerides   Date Value Ref Range Status   08/22/2022 41 0 - 150 mg/dL Final   09/15/2021 66 0 - 150 mg/dL Final     HDL Cholesterol   Date Value Ref Range Status   08/22/2022 62 (H) 40 - 60 mg/dL Final   09/15/2021 61 (H) 40 - 60 mg/dL Final     LDL Cholesterol    Date Value Ref Range Status   08/22/2022 74 0 - 100 mg/dL Final   09/15/2021 73 0 - 100 mg/dL Final     ***       {CC Problem List  Visit Diagnosis  ROS  Review (Popup)  Health Maintenance  Quality  BestPractice  Medications  SmartSets  SnapShot Encounters  Media :23}     Assessment: ***      There are no diagnoses linked to this  encounter.    Plan: ***    The patient will monitor her blood glucose levels ***.  If she develops problematic hyperglycemia or hypoglycemia or adverse drug reactions, she will contact the office for further instructions.    {Time Spent (Optional):22747}    Follow Up {Instructions Charge Capture  Follow-up Communications :23}    No follow-ups on file.    Patient was given instructions and counseling regarding her condition or for health maintenance advice. Please see specific information pulled into the AVS if appropriate.     Jeana Andre MA  10/20/2022      Dictated Utilizing Dragon Dictation.  Please note that portions of this note were completed with a voice recognition program.  Part of this note may be an electronic transcription/translation of spoken language to printed text using the Dragon Dictation System.

## 2022-10-20 ENCOUNTER — APPOINTMENT (OUTPATIENT)
Dept: DIABETES SERVICES | Facility: HOSPITAL | Age: 27
End: 2022-10-20

## 2022-11-09 ENCOUNTER — OFFICE VISIT (OUTPATIENT)
Dept: DIABETES SERVICES | Facility: HOSPITAL | Age: 27
End: 2022-11-09

## 2022-11-09 VITALS
BODY MASS INDEX: 26.6 KG/M2 | HEART RATE: 92 BPM | HEIGHT: 68 IN | SYSTOLIC BLOOD PRESSURE: 142 MMHG | DIASTOLIC BLOOD PRESSURE: 73 MMHG | OXYGEN SATURATION: 99 % | WEIGHT: 175.49 LBS | TEMPERATURE: 98.2 F

## 2022-11-09 DIAGNOSIS — E10.40 TYPE 1 DIABETES MELLITUS WITH DIABETIC NEUROPATHY: ICD-10-CM

## 2022-11-09 DIAGNOSIS — Z96.41 INSULIN PUMP IN PLACE: ICD-10-CM

## 2022-11-09 DIAGNOSIS — E10.65 UNCONTROLLED TYPE 1 DIABETES MELLITUS WITH HYPERGLYCEMIA: Primary | ICD-10-CM

## 2022-11-09 DIAGNOSIS — Z97.8 USES SELF-APPLIED CONTINUOUS GLUCOSE MONITORING DEVICE: ICD-10-CM

## 2022-11-09 DIAGNOSIS — E66.3 OVERWEIGHT (BMI 25.0-29.9): ICD-10-CM

## 2022-11-09 LAB
EXPIRATION DATE: ABNORMAL
GLUCOSE BLDC GLUCOMTR-MCNC: 498 MG/DL (ref 70–99)
HBA1C MFR BLD: 8.2 %
Lab: ABNORMAL

## 2022-11-09 PROCEDURE — G0463 HOSPITAL OUTPT CLINIC VISIT: HCPCS | Performed by: NURSE PRACTITIONER

## 2022-11-09 PROCEDURE — 83036 HEMOGLOBIN GLYCOSYLATED A1C: CPT | Performed by: NURSE PRACTITIONER

## 2022-11-09 PROCEDURE — 99213 OFFICE O/P EST LOW 20 MIN: CPT | Performed by: NURSE PRACTITIONER

## 2022-11-09 PROCEDURE — 82962 GLUCOSE BLOOD TEST: CPT | Performed by: NURSE PRACTITIONER

## 2022-11-09 NOTE — PROGRESS NOTES
Chief Complaint  Diabetes (1 month follow up, med mgt, pump eval, a1c eval )    Referred By: Shilpa Callaway MD    Subjective          Ely Burciaga presents to [facility] for diabetes medication management    History of Present Illness    Visit type:  follow-up  Diabetes type:  Type 1  Current diabetes status/concerns/issues: She feels she has had improvement since upgrading her pump.  She does admit to poor compliance with her pump over the last week because of a death in the family.  She states prior to that she was being very compliant with using her sensor and entering her carbohydrates into her pump.  Other health concerns: No new health concerns  Diabetes symptoms:    Polyuria: No   Polydipsia: No   Polyphagia: No   Blurred vision: No   Excessive fatigue: Yes  Diabetes complications:    Neuro: Neuropathy with complaints of numbness and tingling in her feet and lower extremities  Renal: None  Eyes: None  Amputation/Wounds: None  GI: None  Cardiovascular: None  ED: N/A              Other: None  Hypoglycemia:  Level 1 hypoglycemia (54 mg/dL - 70 mg/dL); Frequency - The insulin pump indicates less than 2% of glucose levels are below target  Hypoglycemia Symptoms:  shaking/tremors  Current diabetes treatment:   Medtronic insulin pump using Humalog insulin.  She does not enter any glucose levels or carbohydrates into her pump.  She will take a random insulin bolus only   Blood glucose device:  Revolve. CGM  Blood glucose monitoring frequency:  Continuous per CGM  Blood glucose range/average: Unfortunately we were unable to upload her pump at today's appointment.  The pump was reviewed and did show the patient has a sensor glucose average of 188 mg/dL at this time.  Diet:  Carbohydrate Counting, Limits high carb/sweet foods  Activity/Exercise:  She is physically active with work    Past Medical History:   Diagnosis Date   • Anxiety    • Bipolar disorder (HCC)    • Depression     • Diabetes (HCC)    • Diabetes mellitus type I (HCC)    • STD exposure      Past Surgical History:   Procedure Laterality Date   •  SECTION       Family History   Problem Relation Age of Onset   • Heart disease Other    • Prostate cancer Other      Social History     Socioeconomic History   • Marital status: Single   • Number of children: 1   Tobacco Use   • Smoking status: Never   • Smokeless tobacco: Never   Vaping Use   • Vaping Use: Every day   • Substances: Nicotine, Flavoring   • Devices: Disposable   Substance and Sexual Activity   • Alcohol use: Not Currently   • Drug use: Never     Comment: Denies substance abuse   • Sexual activity: Yes     Partners: Male     No Known Allergies    Current Outpatient Medications:   •  Accu-Chek FastClix Lancets misc, 1 each 4 (Four) Times a Day., Disp: 102 each, Rfl: 11  •  albuterol sulfate  (90 Base) MCG/ACT inhaler, Inhale 2 puffs Every 4 (Four) Hours As Needed for Shortness of Air., Disp: 1 g, Rfl: 0  •  buPROPion XL (Wellbutrin XL) 150 MG 24 hr tablet, Take 1 tablet by mouth Daily., Disp: 90 tablet, Rfl: 1  •  glucose blood test strip, Test blood glucose 4 times a day and as needed, Disp: 200 each, Rfl: 5  •  HumaLOG 100 UNIT/ML injection, USE IN PUMP. MAXIMUM DAILY DOSE  UNITS DAILY, Disp: 30 mL, Rfl: 2  •  Insulin Infusion Pump Supplies (MiniMed Mckenna Infusion Set) misc, 10 each Every 3 (Three) Days., Disp: 30 each, Rfl: 0  •  levonorgestrel (Mirena, 52 MG,) 20 MCG/24HR IUD, , Disp: , Rfl:   •  lidocaine (XYLOCAINE) 5 % ointment, Apply 1 application topically to the appropriate area as directed Every 2 (Two) Hours As Needed for Mild Pain., Disp: 50 g, Rfl: 2  •  mupirocin (BACTROBAN) 2 % ointment, APPLY 1 APPLICATION TOPICALLY TO THE APPROPRIATE AREA AS DIRECTED 3 (THREE) TIMES A DAY FOR 5 DAYS., Disp: , Rfl:   •  valACYclovir (Valtrex) 500 MG tablet, Take 1 tablet by mouth 2 (Two) Times a Day., Disp: 90 tablet, Rfl: 1    Review of Systems  "  Constitutional: Positive for activity change, appetite change and fatigue. Negative for unexpected weight gain and unexpected weight loss.   Eyes: Negative for blurred vision and visual disturbance.   Gastrointestinal: Negative for abdominal pain, constipation, diarrhea, nausea, vomiting, GERD and indigestion.   Endocrine: Negative for polydipsia, polyphagia and polyuria.   Neurological: Negative for numbness.        Objective     Vitals:    11/09/22 1351   BP: 142/73   BP Location: Right arm   Patient Position: Sitting   Cuff Size: Adult   Pulse: 92   Temp: 98.2 °F (36.8 °C)   SpO2: 99%   Weight: 79.6 kg (175 lb 7.8 oz)   Height: 172.7 cm (68\")   PainSc:   2     Body mass index is 26.68 kg/m².    Physical Exam  Constitutional:       Appearance: Normal appearance.      Comments: Overweight with BMI of 26.68   HENT:      Head: Normocephalic and atraumatic.      Right Ear: External ear normal.      Left Ear: External ear normal.      Nose: Nose normal.   Eyes:      Extraocular Movements: Extraocular movements intact.      Conjunctiva/sclera: Conjunctivae normal.   Pulmonary:      Effort: Pulmonary effort is normal.   Musculoskeletal:         General: Normal range of motion.      Cervical back: Normal range of motion.   Skin:     General: Skin is warm and dry.   Neurological:      General: No focal deficit present.      Mental Status: She is alert and oriented to person, place, and time. Mental status is at baseline.   Psychiatric:         Mood and Affect: Mood normal.         Behavior: Behavior normal.         Thought Content: Thought content normal.         Judgment: Judgment normal.         Result Review :   The following data was reviewed by: JI Lilly on 11/09/2022:    Most Recent A1C    HGBA1C Most Recent 11/9/22   Hemoglobin A1C 8.2             A1C Last 3 Results    HGBA1C Last 3 Results 7/14/22 8/22/22 11/9/22   Hemoglobin A1C 9.3 9.60 (A) 8.2   (A) Abnormal value            Point-of-care A1c " in the office today was 8.2% indicating uncontrolled type 1 diabetes.  This is down from the prior result of 9.6 collected in August of this year.    Glucose   Date Value Ref Range Status   11/09/2022 498 (C) 70 - 99 mg/dL Final     Comment:     Serial Number: [id number]:  [id number]     Point-of-care glucose in the office today is markedly elevated at 498 mg/dL            Assessment: She has had improvement in her A1c but remains above target.  She has had improved compliance with her pump except over the last week.  We had a lengthy discussion about the importance of managing her diabetes despite the stresses that may occur in life.      Diagnoses and all orders for this visit:    1. Uncontrolled type 1 diabetes mellitus with hyperglycemia (HCC) (Primary)  -     POC Glucose  -     POC Glycosylated Hemoglobin (Hb A1C)  -     glucose blood test strip; Test blood glucose 4 times a day and as needed  Dispense: 200 each; Refill: 5    2. Type 1 diabetes mellitus with diabetic neuropathy (HCC)    3. Overweight (BMI 25.0-29.9)    4. Insulin pump in place    5. Uses self-applied continuous glucose monitoring device        Plan: We will have the patient return to the office in 2 months for reevaluation.  Adjustments will be made to her pump settings as needed at that time.  She is to focus on compliance with entering her carbohydrates into her pump with each meal.    The patient will monitor her blood glucose levels using the continuous glucose sensor.  If she develops problematic hyperglycemia or hypoglycemia or adverse drug reactions, she will contact the office for further instructions.        Follow Up     Return in about 2 months (around 1/9/2023) for Pump Eval, CGM Follow-up.    Patient was given instructions and counseling regarding her condition or for health maintenance advice. Please see specific information pulled into the AVS if appropriate.     Caro Singh, APRN  11/09/2022      Dictated Utilizing  Dragon Dictation.  Please note that portions of this note were completed with a voice recognition program.  Part of this note may be an electronic transcription/translation of spoken language to printed text using the Dragon Dictation System.

## 2022-12-02 RX ORDER — INSULIN LISPRO 100 [IU]/ML
INJECTION, SOLUTION INTRAVENOUS; SUBCUTANEOUS
Qty: 30 ML | Refills: 2 | Status: SHIPPED | OUTPATIENT
Start: 2022-12-02 | End: 2023-03-01

## 2022-12-12 ENCOUNTER — TELEPHONE (OUTPATIENT)
Dept: INTERNAL MEDICINE | Facility: CLINIC | Age: 27
End: 2022-12-12

## 2022-12-12 ENCOUNTER — OFFICE VISIT (OUTPATIENT)
Dept: INTERNAL MEDICINE | Facility: CLINIC | Age: 27
End: 2022-12-12

## 2022-12-12 VITALS
WEIGHT: 177 LBS | TEMPERATURE: 96.9 F | HEART RATE: 89 BPM | HEIGHT: 68 IN | RESPIRATION RATE: 18 BRPM | DIASTOLIC BLOOD PRESSURE: 68 MMHG | OXYGEN SATURATION: 99 % | BODY MASS INDEX: 26.83 KG/M2 | SYSTOLIC BLOOD PRESSURE: 122 MMHG

## 2022-12-12 DIAGNOSIS — N89.8 VAGINAL DISCHARGE: ICD-10-CM

## 2022-12-12 DIAGNOSIS — R30.0 DYSURIA: ICD-10-CM

## 2022-12-12 DIAGNOSIS — M54.6 ACUTE LEFT-SIDED THORACIC BACK PAIN: ICD-10-CM

## 2022-12-12 DIAGNOSIS — N92.6 ABNORMAL MENSTRUAL PERIODS: ICD-10-CM

## 2022-12-12 DIAGNOSIS — E10.65 TYPE 1 DIABETES MELLITUS WITH HYPERGLYCEMIA: Primary | ICD-10-CM

## 2022-12-12 LAB
B-HCG UR QL: NEGATIVE
BACTERIA UR QL AUTO: ABNORMAL /HPF
BILIRUB UR QL STRIP: NEGATIVE
CANDIDA SPECIES: NEGATIVE
CLARITY UR: CLEAR
COLOR UR: YELLOW
EXPIRATION DATE: NORMAL
GARDNERELLA VAGINALIS: POSITIVE
GLUCOSE UR STRIP-MCNC: ABNORMAL MG/DL
HGB UR QL STRIP.AUTO: NEGATIVE
HYALINE CASTS UR QL AUTO: ABNORMAL /LPF
INTERNAL NEGATIVE CONTROL: NORMAL
INTERNAL POSITIVE CONTROL: NORMAL
KETONES UR QL STRIP: NEGATIVE
LEUKOCYTE ESTERASE UR QL STRIP.AUTO: NEGATIVE
Lab: NORMAL
NITRITE UR QL STRIP: POSITIVE
PH UR STRIP.AUTO: 5.5 [PH] (ref 5–8)
PROT UR QL STRIP: NEGATIVE
RBC # UR STRIP: ABNORMAL /HPF
REF LAB TEST METHOD: ABNORMAL
SP GR UR STRIP: 1.01 (ref 1–1.03)
SQUAMOUS #/AREA URNS HPF: ABNORMAL /HPF
T VAGINALIS DNA VAG QL PROBE+SIG AMP: NEGATIVE
UROBILINOGEN UR QL STRIP: ABNORMAL
WBC # UR STRIP: ABNORMAL /HPF

## 2022-12-12 PROCEDURE — 81025 URINE PREGNANCY TEST: CPT | Performed by: INTERNAL MEDICINE

## 2022-12-12 PROCEDURE — 87086 URINE CULTURE/COLONY COUNT: CPT | Performed by: INTERNAL MEDICINE

## 2022-12-12 PROCEDURE — 87660 TRICHOMONAS VAGIN DIR PROBE: CPT | Performed by: INTERNAL MEDICINE

## 2022-12-12 PROCEDURE — 87480 CANDIDA DNA DIR PROBE: CPT | Performed by: INTERNAL MEDICINE

## 2022-12-12 PROCEDURE — 87510 GARDNER VAG DNA DIR PROBE: CPT | Performed by: INTERNAL MEDICINE

## 2022-12-12 PROCEDURE — 87077 CULTURE AEROBIC IDENTIFY: CPT | Performed by: INTERNAL MEDICINE

## 2022-12-12 PROCEDURE — 87186 SC STD MICRODIL/AGAR DIL: CPT | Performed by: INTERNAL MEDICINE

## 2022-12-12 PROCEDURE — 99214 OFFICE O/P EST MOD 30 MIN: CPT | Performed by: INTERNAL MEDICINE

## 2022-12-12 PROCEDURE — 81001 URINALYSIS AUTO W/SCOPE: CPT | Performed by: INTERNAL MEDICINE

## 2022-12-12 RX ORDER — NITROFURANTOIN 25; 75 MG/1; MG/1
100 CAPSULE ORAL 2 TIMES DAILY
Qty: 10 CAPSULE | Refills: 0 | Status: SHIPPED | OUTPATIENT
Start: 2022-12-12 | End: 2022-12-17

## 2022-12-12 RX ORDER — BUPROPION HYDROCHLORIDE 150 MG/1
150 TABLET, EXTENDED RELEASE ORAL 2 TIMES DAILY
Qty: 60 TABLET | Refills: 1 | Status: SHIPPED | OUTPATIENT
Start: 2022-12-12 | End: 2023-01-06 | Stop reason: SDUPTHER

## 2022-12-12 NOTE — PROGRESS NOTES
"Chief Complaint  Diabetes (Follow up ), Anxiety, Depression, and Manic Behavior (Bipolar Disorder- discuss medication prescribed)    Subjective          Ely Burciaga presents to [facility]  History of Present Illness     Recently lost her grandmother  States that she feels like she is having signficant highs and lows  She notices the lows are lasting a lot longer  She feels like she is having trouble with anger and irritability  Her pills feel like sugar pills as they just don't even seem to be working  \"I never feel normal\"  \"Sometimes I feel numb\"    Didn't do well on the vraylar  Feels like the wellbutrin isn't strong enough    Feels like she has been getting a lot of changes with her urine and she is getting more of a bacterial vaginosis  She had STI testing recently that was normal    States that most recent A1C was in the 8's  She has been going to her appointments  She is still very up and down although her sugars are better    Also dealing with some back pain in her left upper back region right around her scapula area    Of note patient did have a nosebleed while in the room it was very short and only lasted for approximately 2 to 3 minutes she was able to apply pressure to her nose and get control quickly    Objective   Vital Signs:   /68 (BP Location: Right arm, Patient Position: Sitting, Cuff Size: Adult)   Pulse 89   Temp 96.9 °F (36.1 °C)   Resp 18   Ht 172.7 cm (68\")   Wt 80.3 kg (177 lb)   SpO2 99%   BMI 26.91 kg/m²     Physical Exam  Vitals reviewed.   Constitutional:       Appearance: Normal appearance. She is well-developed.   HENT:      Head: Normocephalic and atraumatic.      Right Ear: External ear normal.      Left Ear: External ear normal.   Eyes:      Conjunctiva/sclera: Conjunctivae normal.      Pupils: Pupils are equal, round, and reactive to light.   Cardiovascular:      Rate and Rhythm: Normal rate and regular rhythm.      " Heart sounds: No murmur heard.    No friction rub. No gallop.   Pulmonary:      Effort: Pulmonary effort is normal.      Breath sounds: Normal breath sounds. No wheezing or rhonchi.   Skin:     General: Skin is warm and dry.   Neurological:      Mental Status: She is alert and oriented to person, place, and time.   Psychiatric:         Mood and Affect: Affect normal.         Behavior: Behavior normal.         Thought Content: Thought content normal.        Result Review :       Common labs    Common Labs 8/17/22 8/17/22 8/22/22 8/22/22 8/22/22 8/22/22 11/9/22    1519 1519 1326 1326 1326 1326    Glucose  415 (A)    184 (A)    BUN  18    14    Creatinine  0.77    0.64    Sodium  131 (A)    137    Potassium  4.4    4.6    Chloride  96 (A)    103    Calcium  10.0    9.6    Albumin  4.90    4.60    Total Bilirubin  0.7    0.7    Alkaline Phosphatase  128 (A)    98    AST (SGOT)  18    30    ALT (SGPT)  17    24    WBC 5.88  4.81       Hemoglobin 13.9  14.8       Hematocrit 38.1  42.8       Platelets 336  403       Total Cholesterol     146     Triglycerides     41     HDL Cholesterol     62 (A)     LDL Cholesterol      74     Hemoglobin A1C    9.60 (A)   8.2   (A) Abnormal value              Results for orders placed or performed in visit on 12/12/22   Urinalysis With Culture If Indicated - Urine, Clean Catch    Specimen: Urine, Clean Catch   Result Value Ref Range    Color, UA Yellow Yellow, Straw    Appearance, UA Clear Clear    pH, UA 5.5 5.0 - 8.0    Specific Gravity, UA 1.015 1.005 - 1.030    Glucose,  mg/dL (1+) (A) Negative    Ketones, UA Negative Negative    Bilirubin, UA Negative Negative    Blood, UA Negative Negative    Protein, UA Negative Negative    Leuk Esterase, UA Negative Negative    Nitrite, UA Positive (A) Negative    Urobilinogen, UA 0.2 E.U./dL 0.2 - 1.0 E.U./dL   POC Pregnancy, Urine    Specimen: Urine   Result Value Ref Range    HCG, Urine, QL Negative Negative    Lot Number [id number]      Internal Positive Control Passed Positive, Passed    Internal Negative Control Passed Negative, Passed    Expiration Date 12/31/23             Procedures        Assessment and Plan    Diagnoses and all orders for this visit:    1. Type 1 diabetes mellitus with hyperglycemia (HCC) (Primary)  Comments:  Continue work with Caro Bhagat  Orders:  -     CBC & Differential  -     Comprehensive Metabolic Panel  -     Hemoglobin A1c  -     Lipid Panel  -     TSH    2. Vaginal discharge  Comments:  will get vaginal swab   Orders:  -     Urinalysis With Culture If Indicated -; Future  -     Gardnerella vaginalis, Trichomonas vaginalis, Candida albicans, DNA - Swab, Vagina  -     Urinalysis With Culture If Indicated - Urine, Clean Catch  -     Urinalysis, Microscopic Only - Urine, Clean Catch  -     Urine Culture - Urine, Urine, Clean Catch    3. Dysuria  Comments:  UA shows slight infection will treat  Orders:  -     Urinalysis With Culture If Indicated -; Future  -     Gardnerella vaginalis, Trichomonas vaginalis, Candida albicans, DNA - Swab, Vagina  -     Urinalysis With Culture If Indicated - Urine, Clean Catch  -     Urinalysis, Microscopic Only - Urine, Clean Catch  -     Urine Culture - Urine, Urine, Clean Catch    4. Abnormal menstrual periods  Comments:  will get pregnancy test  Orders:  -     POC Pregnancy, Urine    5. Acute left-sided thoracic back pain  Comments:  Will get x-ray  Orders:  -     Cancel: XR Spine Thoracic 3 View  -     XR Spine Thoracic 3 View; Future    Other orders  -     sertraline (Zoloft) 50 MG tablet; Take 1 tablet by mouth Daily.  Dispense: 90 tablet; Refill: 1  -     buPROPion SR (Wellbutrin SR) 150 MG 12 hr tablet; Take 1 tablet by mouth 2 (Two) Times a Day.  Dispense: 60 tablet; Refill: 1  -     nitrofurantoin, macrocrystal-monohydrate, (Macrobid) 100 MG capsule; Take 1 capsule by mouth 2 (Two) Times a Day for 5 days.  Dispense: 10 capsule; Refill: 0                  Follow Up   Return in  about 2 months (around 2/12/2023).  Patient was given instructions and counseling regarding her condition or for health maintenance advice. Please see specific information pulled into the AVS if appropriate.

## 2022-12-12 NOTE — TELEPHONE ENCOUNTER
Per - Can you call this patient and tell her that it does look like she has urinary tract infection and I am calling in a medication for this.   Pt stated understanding.

## 2022-12-13 ENCOUNTER — CLINICAL SUPPORT (OUTPATIENT)
Dept: INTERNAL MEDICINE | Facility: CLINIC | Age: 27
End: 2022-12-13

## 2022-12-13 LAB
ALBUMIN SERPL-MCNC: 4.1 G/DL (ref 3.5–5.2)
ALBUMIN/GLOB SERPL: 1.7 G/DL
ALP SERPL-CCNC: 82 U/L (ref 39–117)
ALT SERPL W P-5'-P-CCNC: 12 U/L (ref 1–33)
ANION GAP SERPL CALCULATED.3IONS-SCNC: 10 MMOL/L (ref 5–15)
AST SERPL-CCNC: 18 U/L (ref 1–32)
BILIRUB SERPL-MCNC: 0.5 MG/DL (ref 0–1.2)
BUN SERPL-MCNC: 13 MG/DL (ref 6–20)
BUN/CREAT SERPL: 19.1 (ref 7–25)
CALCIUM SPEC-SCNC: 8.9 MG/DL (ref 8.6–10.5)
CHLORIDE SERPL-SCNC: 105 MMOL/L (ref 98–107)
CHOLEST SERPL-MCNC: 132 MG/DL (ref 0–200)
CO2 SERPL-SCNC: 24 MMOL/L (ref 22–29)
CREAT SERPL-MCNC: 0.68 MG/DL (ref 0.57–1)
EGFRCR SERPLBLD CKD-EPI 2021: 121.1 ML/MIN/1.73
GLOBULIN UR ELPH-MCNC: 2.4 GM/DL
GLUCOSE SERPL-MCNC: 112 MG/DL (ref 65–99)
HBA1C MFR BLD: 8.1 % (ref 4.8–5.6)
HDLC SERPL-MCNC: 55 MG/DL (ref 40–60)
LDLC SERPL CALC-MCNC: 66 MG/DL (ref 0–100)
LDLC/HDLC SERPL: 1.23 {RATIO}
POTASSIUM SERPL-SCNC: 3.8 MMOL/L (ref 3.5–5.2)
PROT SERPL-MCNC: 6.5 G/DL (ref 6–8.5)
SODIUM SERPL-SCNC: 139 MMOL/L (ref 136–145)
TRIGL SERPL-MCNC: 46 MG/DL (ref 0–150)
TSH SERPL DL<=0.05 MIU/L-ACNC: 1.02 UIU/ML (ref 0.27–4.2)
VLDLC SERPL-MCNC: 11 MG/DL (ref 5–40)

## 2022-12-13 PROCEDURE — 80050 GENERAL HEALTH PANEL: CPT | Performed by: INTERNAL MEDICINE

## 2022-12-13 PROCEDURE — 83036 HEMOGLOBIN GLYCOSYLATED A1C: CPT | Performed by: INTERNAL MEDICINE

## 2022-12-13 PROCEDURE — 80061 LIPID PANEL: CPT | Performed by: INTERNAL MEDICINE

## 2022-12-14 LAB
BASOPHILS # BLD AUTO: 0.05 10*3/MM3 (ref 0–0.2)
BASOPHILS NFR BLD AUTO: 1.4 % (ref 0–1.5)
DEPRECATED RDW RBC AUTO: 36.6 FL (ref 37–54)
EOSINOPHIL # BLD AUTO: 0.06 10*3/MM3 (ref 0–0.4)
EOSINOPHIL NFR BLD AUTO: 1.6 % (ref 0.3–6.2)
ERYTHROCYTE [DISTWIDTH] IN BLOOD BY AUTOMATED COUNT: 11.6 % (ref 12.3–15.4)
HCT VFR BLD AUTO: 38.7 % (ref 34–46.6)
HGB BLD-MCNC: 12.8 G/DL (ref 12–15.9)
IMM GRANULOCYTES # BLD AUTO: 0 10*3/MM3 (ref 0–0.05)
IMM GRANULOCYTES NFR BLD AUTO: 0 % (ref 0–0.5)
LYMPHOCYTES # BLD AUTO: 1.74 10*3/MM3 (ref 0.7–3.1)
LYMPHOCYTES NFR BLD AUTO: 47.8 % (ref 19.6–45.3)
MCH RBC QN AUTO: 29.4 PG (ref 26.6–33)
MCHC RBC AUTO-ENTMCNC: 33.1 G/DL (ref 31.5–35.7)
MCV RBC AUTO: 88.8 FL (ref 79–97)
MONOCYTES # BLD AUTO: 0.34 10*3/MM3 (ref 0.1–0.9)
MONOCYTES NFR BLD AUTO: 9.3 % (ref 5–12)
NEUTROPHILS NFR BLD AUTO: 1.45 10*3/MM3 (ref 1.7–7)
NEUTROPHILS NFR BLD AUTO: 39.9 % (ref 42.7–76)
NRBC BLD AUTO-RTO: 0 /100 WBC (ref 0–0.2)
PLATELET # BLD AUTO: 328 10*3/MM3 (ref 140–450)
PMV BLD AUTO: 10.6 FL (ref 6–12)
RBC # BLD AUTO: 4.36 10*6/MM3 (ref 3.77–5.28)
WBC NRBC COR # BLD: 3.64 10*3/MM3 (ref 3.4–10.8)

## 2022-12-15 LAB — BACTERIA SPEC AEROBE CULT: ABNORMAL

## 2022-12-16 RX ORDER — VALACYCLOVIR HYDROCHLORIDE 500 MG/1
TABLET, FILM COATED ORAL
Qty: 180 TABLET | Refills: 1 | Status: SHIPPED | OUTPATIENT
Start: 2022-12-16

## 2022-12-20 RX ORDER — METRONIDAZOLE 500 MG/1
500 TABLET ORAL 3 TIMES DAILY
Qty: 30 TABLET | Refills: 0 | Status: SHIPPED | OUTPATIENT
Start: 2022-12-20 | End: 2022-12-30

## 2023-01-06 RX ORDER — BUPROPION HYDROCHLORIDE 150 MG/1
150 TABLET, EXTENDED RELEASE ORAL 2 TIMES DAILY
Qty: 60 TABLET | Refills: 1 | Status: SHIPPED | OUTPATIENT
Start: 2023-01-06 | End: 2023-01-13 | Stop reason: SDUPTHER

## 2023-01-10 ENCOUNTER — APPOINTMENT (OUTPATIENT)
Dept: DIABETES SERVICES | Facility: HOSPITAL | Age: 28
End: 2023-01-10
Payer: COMMERCIAL

## 2023-01-10 NOTE — PROGRESS NOTES
Chief Complaint  No chief complaint on file.    Referred By: Shilpa Callaway MD    Subjective     {Problem List  Visit Diagnosis   Encounters  Notes  Medications  Labs  Result Review Imaging  Media :23}     Ely Burciaga presents to [facility] for diabetes medication management    History of Present Illness    Visit type:  follow-up  Diabetes type:  Type 1  Current diabetes status/concerns/issues:  ***  Other health concerns: ***  Current Diabetes symptoms:    Polyuria: {Yes No:66809::\"No\"}   Polydipsia: {Yes No:78090::\"No\"}   Polyphagia: {Yes No:11384::\"No\"}   Blurred vision: {Yes No:26179::\"No\"}   Excessive fatigue: {Yes No:22856::\"No\"}  Known Diabetes complications:Neuro: Neuropathy with complaints of numbness and tingling in her feet and lower extremities  Renal: None  Eyes: None  Amputation/Wounds: None  GI: None  Cardiovascular: None  ED: N/A              Other: None    Hypoglycemia:  Level 1 hypoglycemia (54 mg/dL - 70 mg/dL); Frequency - ***  Hypoglycemia Symptoms:  shaking/tremors  Current diabetes treatment:  ***  Medtronic insulin pump using Humalog insulin.  She does not enter any glucose levels or carbohydrates into her pump.  She will take a random insulin bolus only  Plan: We will have the patient return to the office in 2 months for reevaluation.  Adjustments will be made to her pump settings as needed at that time.  She is to focus on compliance with entering her carbohydrates into her pump with each meal.  Using ACEI or ARB: {YES/NO:91582::\"Yes\"}  Blood glucose device:  Medtronic CGM  Blood glucose monitoring frequency:  Continuous per CGM  Blood glucose range/average:  ***  Diet:  {Diabetes Diet Plan:69328}  Activity/Exercise:  {DM Physical Activity:89612}    Past Medical History:   Diagnosis Date   • Anxiety    • Bipolar disorder (HCC)    • Depression    • Diabetes (HCC)    • Diabetes mellitus type I (HCC)    • STD exposure      Past Surgical  History:   Procedure Laterality Date   •  SECTION       Family History   Problem Relation Age of Onset   • Heart disease Other    • Prostate cancer Other      Social History     Socioeconomic History   • Marital status: Single   • Number of children: 1   Tobacco Use   • Smoking status: Never   • Smokeless tobacco: Never   • Tobacco comments:     Vape with Nicotine    Vaping Use   • Vaping Use: Every day   • Substances: Nicotine, Flavoring   • Devices: Disposable   Substance and Sexual Activity   • Alcohol use: Not Currently   • Drug use: Never     Comment: Denies substance abuse   • Sexual activity: Yes     Partners: Male     No Known Allergies    Current Outpatient Medications:   •  Accu-Chek FastClix Lancets misc, 1 each 4 (Four) Times a Day., Disp: 102 each, Rfl: 11  •  albuterol sulfate  (90 Base) MCG/ACT inhaler, Inhale 2 puffs Every 4 (Four) Hours As Needed for Shortness of Air., Disp: 1 g, Rfl: 0  •  buPROPion SR (Wellbutrin SR) 150 MG 12 hr tablet, Take 1 tablet by mouth 2 (Two) Times a Day., Disp: 60 tablet, Rfl: 1  •  glucose blood test strip, Test blood glucose 4 times a day and as needed, Disp: 200 each, Rfl: 5  •  HumaLOG 100 UNIT/ML injection, USE IN PUMP. MAXIMUM DAILY DOSE  UNITS DAILY, Disp: 30 mL, Rfl: 2  •  Insulin Infusion Pump Supplies (MiniMed Ashvin Infusion Set) misc, 10 each Every 3 (Three) Days., Disp: 30 each, Rfl: 0  •  levonorgestrel (Mirena, 52 MG,) 20 MCG/24HR IUD, , Disp: , Rfl:   •  lidocaine (XYLOCAINE) 5 % ointment, Apply 1 application topically to the appropriate area as directed Every 2 (Two) Hours As Needed for Mild Pain., Disp: 50 g, Rfl: 2  •  mupirocin (BACTROBAN) 2 % ointment, APPLY 1 APPLICATION TOPICALLY TO THE APPROPRIATE AREA AS DIRECTED 3 (THREE) TIMES A DAY FOR 5 DAYS., Disp: , Rfl:   •  sertraline (Zoloft) 50 MG tablet, Take 1 tablet by mouth Daily., Disp: 90 tablet, Rfl: 1  •  valACYclovir (VALTREX) 500 MG tablet, TAKE 1 TABLET BY MOUTH TWICE A DAY,  Disp: 180 tablet, Rfl: 1    Review of Systems     Objective     There were no vitals filed for this visit.  There is no height or weight on file to calculate BMI.    Physical Exam    Result Review :{Labs  Result Review  Imaging  Med Tab  Media :23}   The following data was reviewed by: Jeana Andre MA on 01/10/2023:    Most Recent A1C    HGBA1C Most Recent 12/13/22   Hemoglobin A1C 8.10 (A)   (A) Abnormal value              A1C Last 3 Results    HGBA1C Last 3 Results 8/22/22 11/9/22 12/13/22   Hemoglobin A1C 9.60 (A) 8.2 8.10 (A)   (A) Abnormal value            ***    Glucose   Date Value Ref Range Status   11/09/2022 498 (C) 70 - 99 mg/dL Final     Comment:     Serial Number: [id number]:  [id number]   11/22/2020 213 (A) 70 - 99 mg/dL Final     ***    Creatinine   Date Value Ref Range Status   12/13/2022 0.68 0.57 - 1.00 mg/dL Final   08/22/2022 0.64 0.57 - 1.00 mg/dL Final     eGFR   Date Value Ref Range Status   12/13/2022 121.1 >60.0 mL/min/1.73 Final     Comment:     National Kidney Foundation and American Society of Nephrology (ASN) Task Force recommended calculation based on the Chronic Kidney Disease Epidemiology Collaboration (CKD-EPI) equation refit without adjustment for race.   08/22/2022 123.6 >60.0 mL/min/1.73 Final     Comment:     National Kidney Foundation and American Society of Nephrology (ASN) Task Force recommended calculation based on the Chronic Kidney Disease Epidemiology Collaboration (CKD-EPI) equation refit without adjustment for race.     ***    No results found for: MICROALBUR  No results found for: CREATININEUR  Microalbumin/Creatinine Ratio   Date Value Ref Range Status   07/29/2019 6.8 0.0 - 35.0 mg/g[Cre] Final     ***    Total Cholesterol   Date Value Ref Range Status   12/13/2022 132 0 - 200 mg/dL Final   08/22/2022 146 0 - 200 mg/dL Final     Triglycerides   Date Value Ref Range Status   12/13/2022 46 0 - 150 mg/dL Final   08/22/2022 41 0 - 150 mg/dL Final     HDL  Cholesterol   Date Value Ref Range Status   12/13/2022 55 40 - 60 mg/dL Final   08/22/2022 62 (H) 40 - 60 mg/dL Final     LDL Cholesterol    Date Value Ref Range Status   12/13/2022 66 0 - 100 mg/dL Final   08/22/2022 74 0 - 100 mg/dL Final     ***       {CC Problem List  Visit Diagnosis  ROS  Review (Popup)  Health Maintenance  Quality  BestPractice  Medications  SmartSets  SnapShot Encounters  Media :23}     Assessment: ***      There are no diagnoses linked to this encounter.    Plan: ***    The patient will monitor her blood glucose levels ***.  If she develops problematic hyperglycemia or hypoglycemia or adverse drug reactions, she will contact the office for further instructions.    {Time Spent (Optional):14500}    Follow Up {Instructions Charge Capture  Follow-up Communications :23}    No follow-ups on file.    Patient was given instructions and counseling regarding her condition or for health maintenance advice. Please see specific information pulled into the AVS if appropriate.     Jeana Andre MA  01/10/2023      Dictated Utilizing Dragon Dictation.  Please note that portions of this note were completed with a voice recognition program.  Part of this note may be an electronic transcription/translation of spoken language to printed text using the Dragon Dictation System.

## 2023-01-13 ENCOUNTER — TELEPHONE (OUTPATIENT)
Dept: INTERNAL MEDICINE | Facility: CLINIC | Age: 28
End: 2023-01-13
Payer: COMMERCIAL

## 2023-01-13 DIAGNOSIS — F41.9 ANXIETY: Primary | ICD-10-CM

## 2023-01-13 DIAGNOSIS — F32.9 MAJOR DEPRESSIVE DISORDER, REMISSION STATUS UNSPECIFIED, UNSPECIFIED WHETHER RECURRENT: ICD-10-CM

## 2023-01-13 RX ORDER — BUPROPION HYDROCHLORIDE 150 MG/1
150 TABLET, EXTENDED RELEASE ORAL 2 TIMES DAILY
Qty: 60 TABLET | Refills: 1 | Status: SHIPPED | OUTPATIENT
Start: 2023-01-13 | End: 2023-03-10 | Stop reason: SDUPTHER

## 2023-03-01 RX ORDER — INSULIN LISPRO 100 [IU]/ML
INJECTION, SOLUTION INTRAVENOUS; SUBCUTANEOUS
Qty: 30 ML | Refills: 2 | Status: SHIPPED | OUTPATIENT
Start: 2023-03-01

## 2023-03-10 DIAGNOSIS — F32.9 MAJOR DEPRESSIVE DISORDER, REMISSION STATUS UNSPECIFIED, UNSPECIFIED WHETHER RECURRENT: ICD-10-CM

## 2023-03-10 DIAGNOSIS — F41.9 ANXIETY: ICD-10-CM

## 2023-03-10 RX ORDER — BUPROPION HYDROCHLORIDE 150 MG/1
150 TABLET, EXTENDED RELEASE ORAL 2 TIMES DAILY
Qty: 60 TABLET | Refills: 2 | Status: SHIPPED | OUTPATIENT
Start: 2023-03-10

## 2023-03-30 NOTE — ASSESSMENT & PLAN NOTE
Problem: Potential for Falls  Goal: Patient will remain free of falls  Description: INTERVENTIONS:  - Educate patient/family on patient safety including physical limitations  - Instruct patient to call for assistance with activity   - Consult OT/PT to assist with strengthening/mobility   - Keep Call bell within reach  - Keep bed low and locked with side rails adjusted as appropriate  - Keep care items and personal belongings within reach  - Initiate and maintain comfort rounds  - Make Fall Risk Sign visible to staff  - Apply yellow socks and bracelet for high fall risk patients  - Consider moving patient to room near nurses station  Outcome: Progressing     Problem: PAIN - ADULT  Goal: Verbalizes/displays adequate comfort level or baseline comfort level  Description: Interventions:  - Encourage patient to monitor pain and request assistance  - Assess pain using appropriate pain scale  - Administer analgesics based on type and severity of pain and evaluate response  - Implement non-pharmacological measures as appropriate and evaluate response  - Consider cultural and social influences on pain and pain management  - Notify physician/advanced practitioner if interventions unsuccessful or patient reports new pain  Outcome: Progressing     Problem: INFECTION - ADULT  Goal: Absence or prevention of progression during hospitalization  Description: INTERVENTIONS:  - Assess and monitor for signs and symptoms of infection  - Monitor lab/diagnostic results  - Monitor all insertion sites, i e  indwelling lines, tubes, and drains  - Monitor endotracheal if appropriate and nasal secretions for changes in amount and color  - [place] appropriate cooling/warming therapies per order  - Administer medications as ordered  - Instruct and encourage patient and family to use good hand hygiene technique  - Identify and instruct in appropriate isolation precautions for identified infection/condition  Outcome: Progressing  Goal: Absence Reviewed recent labs, A1c slightly worsened to 9.6.  Continue management with endocrinology and diabetes educator.  Will have patient follow up with PCP in 2-4 weeks. Watch for episodes of hypo/hyperglycemic episodes.    of fever/infection during neutropenic period  Description: INTERVENTIONS:  - Monitor WBC    Outcome: Progressing     Problem: SAFETY ADULT  Goal: Patient will remain free of falls  Description: INTERVENTIONS:  - Educate patient/family on patient safety including physical limitations  - Instruct patient to call for assistance with activity   - Consult OT/PT to assist with strengthening/mobility   - Keep Call bell within reach  - Keep bed low and locked with side rails adjusted as appropriate  - Keep care items and personal belongings within reach  - Initiate and maintain comfort rounds  - Make Fall Risk Sign visible to staff  - Apply yellow socks and bracelet for high fall risk patients  - Consider moving patient to room near nurses station  Outcome: Progressing  Goal: Maintain or return to baseline ADL function  Description: INTERVENTIONS:  -  Assess patient's ability to carry out ADLs; assess patient's baseline for ADL function and identify physical deficits which impact ability to perform ADLs (bathing, care of mouth/teeth, toileting, grooming, dressing, etc )  - Assess/evaluate cause of self-care deficits   - Assess range of motion  - Assess patient's mobility; develop plan if impaired  - Assess patient's need for assistive devices and provide as appropriate  - Encourage maximum independence but intervene and supervise when necessary  - Involve family in performance of ADLs  - Assess for home care needs following discharge   - Consider OT consult to assist with ADL evaluation and planning for discharge  - Provide patient education as appropriate  Outcome: Progressing  Goal: Maintains/Returns to pre admission functional level  Description: INTERVENTIONS:  - Perform BMAT or MOVE assessment daily    - Set and communicate daily mobility goal to care team and patient/family/caregiver     - Collaborate with rehabilitation services on mobility goals if consulted  - Out of bed for toileting  - Record patient progress and toleration of activity level   Outcome: Progressing     Problem: DISCHARGE PLANNING  Goal: Discharge to home or other facility with appropriate resources  Description: INTERVENTIONS:  - Identify barriers to discharge w/patient and caregiver  - Arrange for needed discharge resources and transportation as appropriate  - Identify discharge learning needs (meds, wound care, etc )  - Arrange for interpretive services to assist at discharge as needed  - Refer to Case Management Department for coordinating discharge planning if the patient needs post-hospital services based on physician/advanced practitioner order or complex needs related to functional status, cognitive ability, or social support system  Outcome: Progressing     Problem: Knowledge Deficit  Goal: Patient/family/caregiver demonstrates understanding of disease process, treatment plan, medications, and discharge instructions  Description: Complete learning assessment and assess knowledge base    Interventions:  - Provide teaching at level of understanding  - Provide teaching via preferred learning methods  Outcome: Progressing

## 2023-05-26 DIAGNOSIS — F32.9 MAJOR DEPRESSIVE DISORDER, REMISSION STATUS UNSPECIFIED, UNSPECIFIED WHETHER RECURRENT: ICD-10-CM

## 2023-05-26 DIAGNOSIS — F41.9 ANXIETY: ICD-10-CM

## 2023-05-26 RX ORDER — BUPROPION HYDROCHLORIDE 150 MG/1
150 TABLET, EXTENDED RELEASE ORAL 2 TIMES DAILY
Qty: 60 TABLET | Refills: 2 | Status: SHIPPED | OUTPATIENT
Start: 2023-05-26

## 2023-06-12 RX ORDER — INSULIN LISPRO 100 [IU]/ML
INJECTION, SOLUTION INTRAVENOUS; SUBCUTANEOUS
Qty: 30 ML | Refills: 2 | Status: SHIPPED | OUTPATIENT
Start: 2023-06-12

## 2023-07-14 ENCOUNTER — TELEPHONE (OUTPATIENT)
Dept: INTERNAL MEDICINE | Facility: CLINIC | Age: 28
End: 2023-07-14

## 2023-07-14 NOTE — TELEPHONE ENCOUNTER
Pt has been notified per [employer] and that she can come in today last walk in is around 4:30 pm.    If she not able to make it today, please go to [facility] to be treated.

## 2023-07-14 NOTE — TELEPHONE ENCOUNTER
Caller: Ely Burciaga    Relationship: Self    Best call back number:     [phone]       What medication are you requesting: SOMETHING FOR POTENTIAL UTI    What are your current symptoms: BURNING URINATION, CONSTANT URGE TO URINATE, GENITAL ITCHING AND DISCOMFORT    How long have you been experiencing symptoms: 4 DAYS    Have you had these symptoms before:    [x] Yes  [] No    Have you been treated for these symptoms before:   [x] Yes  [] No    If a prescription is needed, what is your preferred pharmacy and phone number: [facility] #[id number] - [place] - [address] - [phone]  - [phone] FX

## 2023-07-14 NOTE — TELEPHONE ENCOUNTER
Yes please have her walk-in ASAP for a UA as well as please ask her to check her sugars and be sure they are well controlled

## 2024-01-29 NOTE — PROGRESS NOTES
Chief Complaint  Diabetes (Follow up, med mgt, A1c eval, pump eval )    Referred By: Shilpa Callaway MD    Subjective          Ely Burciaga presents to [facility] for diabetes medication management    History of Present Illness    Visit type:  follow-up  Diabetes type:  Type 1  Current diabetes status/concerns/issues:  This patient was last seen in 2022.  She was without insurance and has now run out of supplies.  She was told by [employer] she was eligible for a pump upgrade.  She has been stretching her supplies to make them last.  Other health concerns: No new health concerns  Current Diabetes symptoms:    Polyuria: No   Polydipsia: No   Polyphagia: No   Blurred vision: No   Excessive fatigue: No  Known Diabetes complications:  Neuropathy: Numbness and Tingling; Location: Lower Extremities and Feet  Renal: None  Eyes: No current eye exam available in record; Location: N/A  Amputation/Wounds: None  GI: None  Cardiovascular: None  ED: N/A  Other: None  Hypoglycemia:  None reported at this time  Hypoglycemia Symptoms:  No hypoglycemia at this time  Current diabetes treatment:   Medtronic insulin pump using Humalog insulin.  There is very little interaction with her pump currently  Blood glucose device:  Not currently monitoring BG  Blood glucose monitoring frequency:  Not currently monitoring BG  Blood glucose range/average:  Not currently monitoring BG  Glucose Source: N/A  Diet:  Limits high carb/sweet foods, Avoids sugary drinks  Activity/Exercise:  None    Past Medical History:   Diagnosis Date    Anxiety     Bipolar disorder     Depression     Diabetes     Diabetes mellitus type I     STD exposure      Past Surgical History:   Procedure Laterality Date     SECTION       Family History   Problem Relation Age of Onset    Heart disease Other     Prostate cancer Other      Social History     Socioeconomic History    Marital status: Single    Number of children:  "1   Tobacco Use    Smoking status: Never    Smokeless tobacco: Never    Tobacco comments:     Vape with Nicotine    Vaping Use    Vaping Use: Every day    Substances: Nicotine, Flavoring    Devices: Disposable   Substance and Sexual Activity    Alcohol use: Not Currently    Drug use: Never     Comment: Denies substance abuse    Sexual activity: Yes     Partners: Male     No Known Allergies    Current Outpatient Medications:     Accu-Chek FastClix Lancets misc, 1 each 4 (Four) Times a Day., Disp: 102 each, Rfl: 11    albuterol sulfate  (90 Base) MCG/ACT inhaler, Inhale 2 puffs Every 4 (Four) Hours As Needed for Shortness of Air., Disp: 1 g, Rfl: 0    buPROPion SR (Wellbutrin SR) 150 MG 12 hr tablet, Take 1 tablet by mouth 2 (Two) Times a Day., Disp: 60 tablet, Rfl: 2    glucose blood test strip, Test blood glucose 4 times a day and as needed, Disp: 200 each, Rfl: 5    HumaLOG 100 UNIT/ML injection, USE IN PUMP. MAXIMUM DAILY DOSE  UNITS DAILY, Disp: 30 mL, Rfl: 2    Insulin Infusion Pump Supplies (MiniMed Ashvin Infusion Set) misc, 10 each Every 3 (Three) Days., Disp: 30 each, Rfl: 0    levonorgestrel (Mirena, 52 MG,) 20 MCG/24HR IUD, , Disp: , Rfl:     lidocaine (XYLOCAINE) 5 % ointment, Apply 1 application topically to the appropriate area as directed Every 2 (Two) Hours As Needed for Mild Pain., Disp: 50 g, Rfl: 2    Objective     Vitals:    01/30/24 0806   BP: 132/76   BP Location: Left arm   Patient Position: Sitting   Cuff Size: Adult   Pulse: 84   SpO2: 100%   Weight: 81.6 kg (180 lb)   Height: 172.7 cm (68\")   PainSc:   2     Body mass index is 27.37 kg/m².    Physical Exam  Constitutional:       Appearance: Normal appearance.      Comments: Overweight (BMI 25 - 29.9) Pt Current BMI = 27.37    HENT:      Head: Normocephalic and atraumatic.      Right Ear: External ear normal.      Left Ear: External ear normal.      Nose: Nose normal.   Eyes:      Extraocular Movements: Extraocular movements intact. "      Conjunctiva/sclera: Conjunctivae normal.   Pulmonary:      Effort: Pulmonary effort is normal.   Musculoskeletal:         General: Normal range of motion.      Cervical back: Normal range of motion.   Skin:     General: Skin is warm and dry.   Neurological:      General: No focal deficit present.      Mental Status: She is alert and oriented to person, place, and time. Mental status is at baseline.   Psychiatric:         Mood and Affect: Mood normal.         Behavior: Behavior normal.         Thought Content: Thought content normal.         Judgment: Judgment normal.             Result Review :   The following data was reviewed by: JI Lilly on 01/30/2024:    Most Recent A1C          1/30/2024    08:12   HGBA1C Most Recent   Hemoglobin A1C 9.4        A1C Last 3 Results          1/30/2024    08:12   HGBA1C Last 3 Results   Hemoglobin A1C 9.4      A1c collected in the office today is 9.4%, indicating Uncontrolled Type I diabetes.  This result is up from the prior result of 8.1% collected on 12/13/2022    Glucose   Date Value Ref Range Status   01/30/2024 157 (H) 70 - 99 mg/dL Final     Comment:     Serial Number: [id number]:  [id number]     Point of care glucose in the office today is within normal limits for nonfasting glucose            Assessment: Patient's A1c is significantly elevated.  She is rarely taking boluses on her insulin pump.  She is not using her CGM.  She has been stretching her supplies out to try to make them last and states she is now out of supplies.  She would like to upgrade her pump if she is eligible.      Diagnoses and all orders for this visit:    1. Uncontrolled type 1 diabetes mellitus with hyperglycemia (Primary)  -     POC Glycosylated Hemoglobin (Hb A1C)    Other orders  -     POC Glucose        Plan: We will attempt to process the patient for an upgrade to the [employer] 780G insulin pump.  In the meantime she was provided some supplies to help her make it  until her pump can be renewed.  Upon receipt of the device the patient can do the training online or she can choose to come in the office for training.    The patient will monitor her blood glucose levels using the CGM.  If she develops problematic hyperglycemia or hypoglycemia or adverse drug reactions, she will contact the office for further instructions.    I spent 32 minutes caring for Ely on this date of service. This time includes time spent by me in the following activities:preparing for the visit, reviewing tests, obtaining and/or reviewing a separately obtained history, performing a medically appropriate examination and/or evaluation , counseling and educating the patient/family/caregiver, referring and communicating with other health care professionals , documenting information in the medical record, and independently interpreting results and communicating that information with the patient/family/caregiver    Follow Up     Return in about 6 weeks (around 3/12/2024) for Pump Eval, CGM Follow-up.    Patient was given instructions and counseling regarding her condition or for health maintenance advice. Please see specific information pulled into the AVS if appropriate.     Caro Singh, APRN  01/30/2024      Dictated Utilizing Dragon Dictation.  Please note that portions of this note were completed with a voice recognition program.  Part of this note may be an electronic transcription/translation of spoken language to printed text using the Dragon Dictation System.

## 2024-01-30 ENCOUNTER — OFFICE VISIT (OUTPATIENT)
Dept: DIABETES SERVICES | Facility: HOSPITAL | Age: 29
End: 2024-01-30
Payer: COMMERCIAL

## 2024-01-30 VITALS
HEART RATE: 84 BPM | DIASTOLIC BLOOD PRESSURE: 76 MMHG | OXYGEN SATURATION: 100 % | HEIGHT: 68 IN | WEIGHT: 180 LBS | SYSTOLIC BLOOD PRESSURE: 132 MMHG | BODY MASS INDEX: 27.28 KG/M2

## 2024-01-30 DIAGNOSIS — E10.65 UNCONTROLLED TYPE 1 DIABETES MELLITUS WITH HYPERGLYCEMIA: Primary | ICD-10-CM

## 2024-01-30 DIAGNOSIS — Z96.41 INSULIN PUMP IN PLACE: ICD-10-CM

## 2024-01-30 LAB
EXPIRATION DATE: ABNORMAL
GLUCOSE BLDC GLUCOMTR-MCNC: 157 MG/DL (ref 70–99)
HBA1C MFR BLD: 9.4 % (ref 4.5–5.7)
Lab: ABNORMAL

## 2024-01-30 PROCEDURE — 3046F HEMOGLOBIN A1C LEVEL >9.0%: CPT | Performed by: NURSE PRACTITIONER

## 2024-01-30 PROCEDURE — 1160F RVW MEDS BY RX/DR IN RCRD: CPT | Performed by: NURSE PRACTITIONER

## 2024-01-30 PROCEDURE — 99214 OFFICE O/P EST MOD 30 MIN: CPT | Performed by: NURSE PRACTITIONER

## 2024-01-30 PROCEDURE — 82948 REAGENT STRIP/BLOOD GLUCOSE: CPT | Performed by: NURSE PRACTITIONER

## 2024-01-30 PROCEDURE — 1159F MED LIST DOCD IN RCRD: CPT | Performed by: NURSE PRACTITIONER

## 2024-01-30 PROCEDURE — G0463 HOSPITAL OUTPT CLINIC VISIT: HCPCS | Performed by: NURSE PRACTITIONER

## 2024-02-10 ENCOUNTER — HOSPITAL ENCOUNTER (EMERGENCY)
Facility: HOSPITAL | Age: 29
Discharge: HOME | End: 2024-02-10
Attending: EMERGENCY MEDICINE
Payer: COMMERCIAL

## 2024-02-10 ENCOUNTER — APPOINTMENT (OUTPATIENT)
Dept: CT IMAGING | Facility: HOSPITAL | Age: 29
End: 2024-02-10
Payer: COMMERCIAL

## 2024-02-10 VITALS
RESPIRATION RATE: 20 BRPM | WEIGHT: 187 LBS | SYSTOLIC BLOOD PRESSURE: 144 MMHG | HEART RATE: 147 BPM | OXYGEN SATURATION: 100 % | HEIGHT: 68 IN | BODY MASS INDEX: 28.34 KG/M2 | TEMPERATURE: 99.2 F | DIASTOLIC BLOOD PRESSURE: 56 MMHG

## 2024-02-10 DIAGNOSIS — E11.65 HYPERGLYCEMIA DUE TO DIABETES MELLITUS: ICD-10-CM

## 2024-02-10 DIAGNOSIS — S00.83XA CONTUSION OF FACE, INITIAL ENCOUNTER: ICD-10-CM
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
## 2024-02-10 LAB
ALBUMIN SERPL-MCNC: 4 G/DL (ref 3.5–5.2)
ALBUMIN/GLOB SERPL: 1.3 G/DL
ALP SERPL-CCNC: 105 U/L (ref 39–117)
ALT SERPL W P-5'-P-CCNC: 19 U/L (ref 1–33)
ANION GAP SERPL CALCULATED.3IONS-SCNC: 14.8 MMOL/L (ref 5–15)
AST SERPL-CCNC: 20 U/L (ref 1–32)
BASOPHILS # BLD AUTO: 0.05 10*3/MM3 (ref 0–0.2)
BASOPHILS NFR BLD AUTO: 0.6 % (ref 0–1.5)
BILIRUB SERPL-MCNC: 0.5 MG/DL (ref 0–1.2)
BUN SERPL-MCNC: 14 MG/DL (ref 6–20)
BUN/CREAT SERPL: 16.9 (ref 7–25)
CALCIUM SPEC-SCNC: 9.3 MG/DL (ref 8.6–10.5)
CHLORIDE SERPL-SCNC: 96 MMOL/L (ref 98–107)
CO2 SERPL-SCNC: 16.2 MMOL/L (ref 22–29)
CREAT SERPL-MCNC: 0.83 MG/DL (ref 0.57–1)
DEPRECATED RDW RBC AUTO: 34.7 FL (ref 37–54)
EGFRCR SERPLBLD CKD-EPI 2021: 97.4 ML/MIN/1.73
EOSINOPHIL # BLD AUTO: 0.01 10*3/MM3 (ref 0–0.4)
EOSINOPHIL NFR BLD AUTO: 0.1 % (ref 0.3–6.2)
ERYTHROCYTE [DISTWIDTH] IN BLOOD BY AUTOMATED COUNT: 11.4 % (ref 12.3–15.4)
GLOBULIN UR ELPH-MCNC: 3 GM/DL
GLUCOSE BLDC GLUCOMTR-MCNC: 281 MG/DL (ref 70–99)
GLUCOSE BLDC GLUCOMTR-MCNC: 512 MG/DL (ref 70–99)
GLUCOSE SERPL-MCNC: 592 MG/DL (ref 65–99)
HAV IGM SERPL QL IA: NORMAL
HBV CORE IGM SERPL QL IA: NORMAL
HBV SURFACE AG SERPL QL IA: NORMAL
HCT VFR BLD AUTO: 35.9 % (ref 34–46.6)
HCV AB SER DONR QL: NORMAL
HGB BLD-MCNC: 12.8 G/DL (ref 12–15.9)
HIV 1+2 AB+HIV1P24 AG SERPLBLD IA.RAPID: NORMAL
HIV 1+2 AB+HIV1P24 AG SERPLBLD IA.RAPID: NORMAL
IMM GRANULOCYTES # BLD AUTO: 0.03 10*3/MM3 (ref 0–0.05)
IMM GRANULOCYTES NFR BLD AUTO: 0.3 % (ref 0–0.5)
LYMPHOCYTES # BLD AUTO: 0.92 10*3/MM3 (ref 0.7–3.1)
LYMPHOCYTES NFR BLD AUTO: 10.4 % (ref 19.6–45.3)
MCH RBC QN AUTO: 30 PG (ref 26.6–33)
MCHC RBC AUTO-ENTMCNC: 35.7 G/DL (ref 31.5–35.7)
MCV RBC AUTO: 84.1 FL (ref 79–97)
MONOCYTES # BLD AUTO: 0.51 10*3/MM3 (ref 0.1–0.9)
MONOCYTES NFR BLD AUTO: 5.8 % (ref 5–12)
NEUTROPHILS NFR BLD AUTO: 7.34 10*3/MM3 (ref 1.7–7)
NEUTROPHILS NFR BLD AUTO: 82.8 % (ref 42.7–76)
NRBC BLD AUTO-RTO: 0 /100 WBC (ref 0–0.2)
PLATELET # BLD AUTO: 318 10*3/MM3 (ref 140–450)
PMV BLD AUTO: 10.2 FL (ref 6–12)
POTASSIUM SERPL-SCNC: 4.3 MMOL/L (ref 3.5–5.2)
PROT SERPL-MCNC: 7 G/DL (ref 6–8.5)
RBC # BLD AUTO: 4.27 10*6/MM3 (ref 3.77–5.28)
SODIUM SERPL-SCNC: 127 MMOL/L (ref 136–145)
WBC NRBC COR # BLD AUTO: 8.86 10*3/MM3 (ref 3.4–10.8)

## 2024-02-10 PROCEDURE — G0475 HIV COMBINATION ASSAY: HCPCS | Performed by: EMERGENCY MEDICINE

## 2024-02-10 PROCEDURE — 70450 CT HEAD/BRAIN W/O DYE: CPT

## 2024-02-10 PROCEDURE — 63710000001 INSULIN REGULAR HUMAN PER 5 UNITS: Performed by: EMERGENCY MEDICINE

## 2024-02-10 PROCEDURE — 80074 ACUTE HEPATITIS PANEL: CPT | Performed by: EMERGENCY MEDICINE

## 2024-02-10 PROCEDURE — 82948 REAGENT STRIP/BLOOD GLUCOSE: CPT

## 2024-02-10 PROCEDURE — 99284 EMERGENCY DEPT VISIT MOD MDM: CPT

## 2024-02-10 PROCEDURE — 36415 COLL VENOUS BLD VENIPUNCTURE: CPT

## 2024-02-10 PROCEDURE — 72125 CT NECK SPINE W/O DYE: CPT

## 2024-02-10 PROCEDURE — 70486 CT MAXILLOFACIAL W/O DYE: CPT

## 2024-02-10 PROCEDURE — 85025 COMPLETE CBC W/AUTO DIFF WBC: CPT | Performed by: EMERGENCY MEDICINE

## 2024-02-10 PROCEDURE — 96372 THER/PROPH/DIAG INJ SC/IM: CPT

## 2024-02-10 PROCEDURE — 80053 COMPREHEN METABOLIC PANEL: CPT | Performed by: EMERGENCY MEDICINE

## 2024-02-10 PROCEDURE — 25010000002 KETOROLAC TROMETHAMINE PER 15 MG: Performed by: EMERGENCY MEDICINE

## 2024-02-10 PROCEDURE — 25810000003 SODIUM CHLORIDE 0.9 % SOLUTION: Performed by: EMERGENCY MEDICINE

## 2024-02-10 RX ORDER — KETOROLAC TROMETHAMINE 30 MG/ML
30 INJECTION, SOLUTION INTRAMUSCULAR; INTRAVENOUS ONCE
Status: COMPLETED | OUTPATIENT
Start: 2024-02-10 | End: 2024-02-10

## 2024-02-10 RX ORDER — KETOROLAC TROMETHAMINE 30 MG/ML
30 INJECTION, SOLUTION INTRAMUSCULAR; INTRAVENOUS ONCE
Status: DISCONTINUED | OUTPATIENT
Start: 2024-02-10 | End: 2024-02-10

## 2024-02-10 RX ORDER — IBUPROFEN 600 MG/1
600 TABLET ORAL ONCE
Status: COMPLETED | OUTPATIENT
Start: 2024-02-10 | End: 2024-02-10

## 2024-02-10 RX ADMIN — SODIUM CHLORIDE 2000 ML: 9 INJECTION, SOLUTION INTRAVENOUS at 09:06

## 2024-02-10 RX ADMIN — INSULIN HUMAN 10 UNITS: 100 INJECTION, SOLUTION PARENTERAL at 09:17

## 2024-02-10 RX ADMIN — IBUPROFEN 600 MG: 600 TABLET, FILM COATED ORAL at 05:46

## 2024-02-10 RX ADMIN — KETOROLAC TROMETHAMINE 30 MG: 30 INJECTION, SOLUTION INTRAMUSCULAR; INTRAVENOUS at 07:44
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
## 2024-02-12 RX ORDER — INSULIN LISPRO 100 [IU]/ML
INJECTION, SOLUTION INTRAVENOUS; SUBCUTANEOUS
Qty: 30 ML | Refills: 2 | Status: SHIPPED | OUTPATIENT
Start: 2024-02-12

## 2024-03-13 ENCOUNTER — EDUCATION (OUTPATIENT)
Dept: DIABETES SERVICES | Facility: HOSPITAL | Age: 29
End: 2024-03-13
Payer: COMMERCIAL

## 2024-03-13 DIAGNOSIS — E10.9 DIABETES MELLITUS TYPE 1, CONTROLLED, WITHOUT COMPLICATIONS: Primary | ICD-10-CM

## 2024-03-14 NOTE — PROGRESS NOTES
Ely Burciaga 28 y.o. presents for Tauntrtronic 780 insulin pump instructions and CGM G4. Insulin pump training check list completed.  Patient stated they have viewed some of the insulin pump videos.  Patient was able to maneuver pump screens without difficulty.  Patient applied insulin pump insertion site without difficulty 15-15 rule for treatment of low blood glucose was reviewed with patient. Patient was explained to always have back up supplies including blood glucose meter, strips, lancets, basal insulin, short acting insulin pens or vials with insulin syringes and emergency glucagon. Patient was given DSME staff contact information and encouraged patient to contact staff with questions or concerns.    Insulin pump settings are as follows:  12 AM to 12 AM 1.2 units/h, carb ratio 1 unit per 12 carbs, insulin sensitivity 1 unit per 30 points above target, target blood glucose 110-1 40  Active insulin 3 hours, maximum bolus 25 units, maximum basal rate 3 units/h    Time started: 9:00    Time ended: 10:30    Alejandra Stewart RNC-AWHC, MLDE, [facility]  03/13/2024

## 2024-03-19 ENCOUNTER — EDUCATION (OUTPATIENT)
Dept: DIABETES SERVICES | Facility: HOSPITAL | Age: 29
End: 2024-03-19
Payer: COMMERCIAL

## 2024-03-19 DIAGNOSIS — E10.9 DIABETES MELLITUS TYPE 1, CONTROLLED, WITHOUT COMPLICATIONS: Primary | ICD-10-CM

## 2024-03-20 NOTE — PROGRESS NOTES
Ely presented to go into automode.  Caro WORKMAN placed Medtronic insulin pump into automode and educated patient regarding automode.

## 2024-05-08 RX ORDER — INSULIN LISPRO 100 [IU]/ML
INJECTION, SOLUTION INTRAVENOUS; SUBCUTANEOUS
Qty: 30 ML | Refills: 2 | Status: SHIPPED | OUTPATIENT
Start: 2024-05-08

## 2024-08-20 RX ORDER — INSULIN LISPRO 100 [IU]/ML
INJECTION, SOLUTION INTRAVENOUS; SUBCUTANEOUS
Qty: 30 ML | Refills: 2 | Status: SHIPPED | OUTPATIENT
Start: 2024-08-20

## 2024-09-23 RX ORDER — LIDOCAINE 50 MG/G
1 OINTMENT TOPICAL
Qty: 35.44 G | Refills: 2 | Status: SHIPPED | OUTPATIENT
Start: 2024-09-23

## 2025-01-07 ENCOUNTER — TELEPHONE (OUTPATIENT)
Dept: INTERNAL MEDICINE | Facility: CLINIC | Age: 30
End: 2025-01-07
Payer: COMMERCIAL

## 2025-01-07 DIAGNOSIS — F31.81 BIPOLAR II DISORDER: Primary | ICD-10-CM

## 2025-01-07 DIAGNOSIS — F33.1 MAJOR DEPRESSIVE DISORDER, RECURRENT, MODERATE: ICD-10-CM

## 2025-01-07 NOTE — TELEPHONE ENCOUNTER
Order placed.  Dr. Rojas is wonderful it does sometimes take a little bit to get in with him.  Please be sure she feels that she is safe while waiting for the appointment or should we see her prior?

## 2025-01-07 NOTE — TELEPHONE ENCOUNTER
Caller: Ely Burciaga    Relationship: Self    Best call back number: [phone]     What is the medical concern/diagnosis: MENTAL HEALTH     What specialty or service is being requested: PSYCHIATRIST     What is the provider, practice or medical service name: [facility]     What is the office location: [address] [place] [address] [address]    What is the office phone number: [phone]    Any additional details: PLEASE CALL AND ADVISE.

## 2025-01-09 NOTE — TELEPHONE ENCOUNTER
Spoke with patient rely message, pt stated she would like to make an appt with pcp, please advise when to scheduled pt.
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
## 2025-01-13 ENCOUNTER — OFFICE VISIT (OUTPATIENT)
Dept: INTERNAL MEDICINE | Facility: CLINIC | Age: 30
End: 2025-01-13
Payer: COMMERCIAL

## 2025-01-13 VITALS
HEART RATE: 92 BPM | TEMPERATURE: 97.5 F | SYSTOLIC BLOOD PRESSURE: 142 MMHG | WEIGHT: 192 LBS | BODY MASS INDEX: 29.1 KG/M2 | HEIGHT: 68 IN | RESPIRATION RATE: 16 BRPM | OXYGEN SATURATION: 98 % | DIASTOLIC BLOOD PRESSURE: 70 MMHG

## 2025-01-13 DIAGNOSIS — E10.65 TYPE 1 DIABETES MELLITUS WITH HYPERGLYCEMIA: Primary | ICD-10-CM

## 2025-01-13 DIAGNOSIS — Z30.9 ENCOUNTER FOR CONTRACEPTIVE MANAGEMENT, UNSPECIFIED TYPE: ICD-10-CM

## 2025-01-13 DIAGNOSIS — F33.1 MAJOR DEPRESSIVE DISORDER, RECURRENT, MODERATE: ICD-10-CM

## 2025-01-13 DIAGNOSIS — R45.851 PASSIVE SUICIDAL IDEATIONS: ICD-10-CM

## 2025-01-13 LAB
ALBUMIN SERPL-MCNC: 3.8 G/DL (ref 3.5–5.2)
ALBUMIN UR-MCNC: <1.2 MG/DL
ALBUMIN/GLOB SERPL: 1.2 G/DL
ALP SERPL-CCNC: 104 U/L (ref 39–117)
ALT SERPL W P-5'-P-CCNC: 23 U/L (ref 1–33)
ANION GAP SERPL CALCULATED.3IONS-SCNC: 8 MMOL/L (ref 5–15)
AST SERPL-CCNC: 31 U/L (ref 1–32)
BASOPHILS # BLD AUTO: 0.05 10*3/MM3 (ref 0–0.2)
BASOPHILS NFR BLD AUTO: 1 % (ref 0–1.5)
BILIRUB SERPL-MCNC: 0.3 MG/DL (ref 0–1.2)
BUN SERPL-MCNC: 9 MG/DL (ref 6–20)
BUN/CREAT SERPL: 12.9 (ref 7–25)
CALCIUM SPEC-SCNC: 8.9 MG/DL (ref 8.6–10.5)
CHLORIDE SERPL-SCNC: 105 MMOL/L (ref 98–107)
CHOLEST SERPL-MCNC: 147 MG/DL (ref 0–200)
CO2 SERPL-SCNC: 23 MMOL/L (ref 22–29)
CREAT SERPL-MCNC: 0.7 MG/DL (ref 0.57–1)
DEPRECATED RDW RBC AUTO: 34.5 FL (ref 37–54)
EGFRCR SERPLBLD CKD-EPI 2021: 118.7 ML/MIN/1.73
EOSINOPHIL # BLD AUTO: 0.12 10*3/MM3 (ref 0–0.4)
EOSINOPHIL NFR BLD AUTO: 2.5 % (ref 0.3–6.2)
ERYTHROCYTE [DISTWIDTH] IN BLOOD BY AUTOMATED COUNT: 10.9 % (ref 12.3–15.4)
GLOBULIN UR ELPH-MCNC: 3.1 GM/DL
GLUCOSE SERPL-MCNC: 268 MG/DL (ref 65–99)
HBA1C MFR BLD: 10.4 % (ref 4.8–5.6)
HCT VFR BLD AUTO: 38.9 % (ref 34–46.6)
HDLC SERPL-MCNC: 49 MG/DL (ref 40–60)
HGB BLD-MCNC: 13.6 G/DL (ref 12–15.9)
IMM GRANULOCYTES # BLD AUTO: 0.01 10*3/MM3 (ref 0–0.05)
IMM GRANULOCYTES NFR BLD AUTO: 0.2 % (ref 0–0.5)
LDLC SERPL CALC-MCNC: 86 MG/DL (ref 0–100)
LDLC/HDLC SERPL: 1.78 {RATIO}
LYMPHOCYTES # BLD AUTO: 1.67 10*3/MM3 (ref 0.7–3.1)
LYMPHOCYTES NFR BLD AUTO: 35 % (ref 19.6–45.3)
MCH RBC QN AUTO: 30.8 PG (ref 26.6–33)
MCHC RBC AUTO-ENTMCNC: 35 G/DL (ref 31.5–35.7)
MCV RBC AUTO: 88.2 FL (ref 79–97)
MONOCYTES # BLD AUTO: 0.47 10*3/MM3 (ref 0.1–0.9)
MONOCYTES NFR BLD AUTO: 9.9 % (ref 5–12)
NEUTROPHILS NFR BLD AUTO: 2.45 10*3/MM3 (ref 1.7–7)
NEUTROPHILS NFR BLD AUTO: 51.4 % (ref 42.7–76)
NRBC BLD AUTO-RTO: 0 /100 WBC (ref 0–0.2)
PLATELET # BLD AUTO: 417 10*3/MM3 (ref 140–450)
PMV BLD AUTO: 10.4 FL (ref 6–12)
POTASSIUM SERPL-SCNC: 4.4 MMOL/L (ref 3.5–5.2)
PROT SERPL-MCNC: 6.9 G/DL (ref 6–8.5)
RBC # BLD AUTO: 4.41 10*6/MM3 (ref 3.77–5.28)
SODIUM SERPL-SCNC: 136 MMOL/L (ref 136–145)
TRIGL SERPL-MCNC: 55 MG/DL (ref 0–150)
TSH SERPL DL<=0.05 MIU/L-ACNC: 2.38 UIU/ML (ref 0.27–4.2)
VLDLC SERPL-MCNC: 12 MG/DL (ref 5–40)
WBC NRBC COR # BLD AUTO: 4.77 10*3/MM3 (ref 3.4–10.8)

## 2025-01-13 PROCEDURE — 82043 UR ALBUMIN QUANTITATIVE: CPT | Performed by: INTERNAL MEDICINE

## 2025-01-13 PROCEDURE — 1126F AMNT PAIN NOTED NONE PRSNT: CPT | Performed by: INTERNAL MEDICINE

## 2025-01-13 PROCEDURE — 84443 ASSAY THYROID STIM HORMONE: CPT | Performed by: INTERNAL MEDICINE

## 2025-01-13 PROCEDURE — 1159F MED LIST DOCD IN RCRD: CPT | Performed by: INTERNAL MEDICINE

## 2025-01-13 PROCEDURE — 99214 OFFICE O/P EST MOD 30 MIN: CPT | Performed by: INTERNAL MEDICINE

## 2025-01-13 PROCEDURE — 3046F HEMOGLOBIN A1C LEVEL >9.0%: CPT | Performed by: INTERNAL MEDICINE

## 2025-01-13 PROCEDURE — 80053 COMPREHEN METABOLIC PANEL: CPT | Performed by: INTERNAL MEDICINE

## 2025-01-13 PROCEDURE — 85025 COMPLETE CBC W/AUTO DIFF WBC: CPT | Performed by: INTERNAL MEDICINE

## 2025-01-13 PROCEDURE — 83036 HEMOGLOBIN GLYCOSYLATED A1C: CPT | Performed by: INTERNAL MEDICINE

## 2025-01-13 PROCEDURE — 1160F RVW MEDS BY RX/DR IN RCRD: CPT | Performed by: INTERNAL MEDICINE

## 2025-01-13 PROCEDURE — 80061 LIPID PANEL: CPT | Performed by: INTERNAL MEDICINE

## 2025-01-13 RX ORDER — DESVENLAFAXINE 25 MG/1
25 TABLET, EXTENDED RELEASE ORAL DAILY
Qty: 90 TABLET | Refills: 1 | Status: SHIPPED | OUTPATIENT
Start: 2025-01-13

## 2025-01-13 NOTE — ASSESSMENT & PLAN NOTE
Orders:    CBC & Differential    Comprehensive Metabolic Panel    Lipid Panel    TSH    MicroAlbumin, Urine, Random - Urine, Clean Catch    Hemoglobin A1c
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
## 2025-04-18 NOTE — PROGRESS NOTES
NEW PATIENT GYN VISIT    Chief Complaint   Patient presents with    Contraception    new patient       HPI:   29 y.o. LMP: No LMP recorded. Patient has had an implant.     Social History     Substance and Sexual Activity   Sexual Activity Yes    Partners: Male       History of Present Illness  The patient is a 29-year-old female who presents today for a GYN visit.    She has been using a Mirena intrauterine device (IUD) since 2018 and is currently contemplating tubal ligation. There is a desire for additional children but also significant fear related to childbirth due to a traumatic experience during her first delivery at the age of 14, where the epidural anesthesia was ineffective during an emergency . This incident has resulted in symptoms consistent with post-traumatic stress disorder (PTSD). Obstetric or gynecological care has not been sought for several years due to these fears. She is sexually active, though infrequently, and takes additional precautions to prevent pregnancy. There is consideration of replacing the IUD and she has noticed increased cramping and spotting, with menstrual blood appearing maroonish and dried brown rather than red. Uncertainty about fertility status is expressed, and advice is sought on whether there are more effective birth control methods than the IUD. She has been in a relationship with her current partner for 4 years and would like to have a child with him but is concerned about potential complications due to diabetes. She is currently under the care of a psychiatrist and therapist and is beginning to address past traumas. Nexplanon was previously used but discontinued due to weight gain and heavy menstrual bleeding. Difficulty with hormonal birth control is reported, and she became pregnant while on the pill at age 14.    Recurrent borderline urinary tract infections (UTIs) or yeast infections are experienced, despite maintaining good personal hygiene.  "Persistent itching and irritation are described, and testing for these symptoms has been undergone. Diabetes is uncontrolled but not severe.    CONTRACEPTION:  Type used: Mirena IUD since 2018  History: Previously used Nexplanon, discontinued due to weight gain and heavy bleeding; became pregnant while on the pill at age 14  Reproductive plans: Considering tubal ligation but also desires potential future pregnancy    PAST SURGICAL HISTORY: Emergency  at age 14      History: PMHx, Meds, Allergies, PSHx, Social Hx, and POBHx all reviewed and updated.  Last Completed Pap Smear    This patient has no relevant Health Maintenance data.         PHYSICAL EXAM:  /82   Pulse 82   Ht 170.2 cm (67\")   Wt 84.4 kg (186 lb)   Breastfeeding No   BMI 29.13 kg/m²   General- NAD, alert and oriented, appropriate  Psych- Normal mood  Respiratory- No labored breathing  Extremities- No edema  Skin- No lesions, no rashes        Results        ASSESSMENT AND PLAN:  Diagnoses and all orders for this visit:    1. Encounter for other general counseling or advice on contraception (Primary)    2. Type 1 diabetes mellitus with hyperglycemia    3. Major depressive disorder, recurrent, moderate    4. Anxiety    5. Vulvar irritation  -     fluconazole (Diflucan) 150 MG tablet; Take one pill and repeat in 72hrs.  Dispense: 2 tablet; Refill: 0                Assessment & Plan  1. Gynecological issues  - Mirena IUD remains effective for another year  - Discussed option of tubal ligation, including risks and benefits  - Procedure involves complete removal of fallopian tubes, permanent, does not affect hormone levels or menstrual cycles  - Discussed potential risks of surgery: bleeding, infection, injury to surrounding structures  - Procedure will not result in weight gain or hormonal changes  - Discussed possibility of hysterectomy, no medical indication at present  - Advised consideration of ablation if experiencing heavy " periods    -If uncertain about future fertility, discussed IUD removal and insertion in the office. Suggested use of Xanax or Valium on the day of the procedure to manage anxiety  - Mentioned availability of numbing spray for cervix on the day of the procedure  - Use of copper IUDs not recommended due to potential for increased bleeding and cramping  - Encouraged to discuss options with partner and contact with decision    2. Recurrent yeast infections  - Uncontrolled diabetes could be contributing to recurrent yeast infections  - Prescription for Diflucan provided to manage condition      Follow Up:  Return if symptoms worsen or fail to improve.    Patient or patient representative verbalized consent for the use of Ambient Listening during the visit with  Nancy Banuelos DO for chart documentation. 4/23/2025  12:45 EDT        Nancy Banuelos DO  04/23/2025    [facility] [place] [id number]  [facility]  [address] [place] [address] [address] [address]  Dept: [phone]  Dept Fax: [phone]  Loc: [phone]

## 2025-04-21 ENCOUNTER — OFFICE VISIT (OUTPATIENT)
Dept: PSYCHIATRY | Facility: CLINIC | Age: 30
End: 2025-04-21
Payer: COMMERCIAL

## 2025-04-21 VITALS
HEIGHT: 68 IN | WEIGHT: 187 LBS | HEART RATE: 83 BPM | SYSTOLIC BLOOD PRESSURE: 132 MMHG | DIASTOLIC BLOOD PRESSURE: 72 MMHG | BODY MASS INDEX: 28.34 KG/M2

## 2025-04-21 DIAGNOSIS — F41.1 GENERALIZED ANXIETY DISORDER: ICD-10-CM

## 2025-04-21 DIAGNOSIS — F51.05 INSOMNIA DUE TO MENTAL CONDITION: ICD-10-CM

## 2025-04-21 DIAGNOSIS — F31.81 BIPOLAR II DISORDER: ICD-10-CM

## 2025-04-21 DIAGNOSIS — F43.10 POST TRAUMATIC STRESS DISORDER (PTSD): Primary | ICD-10-CM

## 2025-04-21 DIAGNOSIS — F41.0 PANIC ATTACKS: ICD-10-CM

## 2025-04-21 DIAGNOSIS — F60.3 BORDERLINE PERSONALITY DISORDER: ICD-10-CM

## 2025-04-21 PROCEDURE — 1159F MED LIST DOCD IN RCRD: CPT | Performed by: STUDENT IN AN ORGANIZED HEALTH CARE EDUCATION/TRAINING PROGRAM

## 2025-04-21 PROCEDURE — 90792 PSYCH DIAG EVAL W/MED SRVCS: CPT | Performed by: STUDENT IN AN ORGANIZED HEALTH CARE EDUCATION/TRAINING PROGRAM

## 2025-04-21 PROCEDURE — 1160F RVW MEDS BY RX/DR IN RCRD: CPT | Performed by: STUDENT IN AN ORGANIZED HEALTH CARE EDUCATION/TRAINING PROGRAM

## 2025-04-21 RX ORDER — PRAZOSIN HYDROCHLORIDE 1 MG/1
1 CAPSULE ORAL NIGHTLY
Qty: 30 CAPSULE | Refills: 2 | Status: SHIPPED | OUTPATIENT
Start: 2025-04-21

## 2025-04-21 NOTE — PLAN OF CARE
[facility] to help manage depression and anxiety related to history of trauma, relationships/trust, family conflict

## 2025-04-23 ENCOUNTER — OFFICE VISIT (OUTPATIENT)
Dept: OBSTETRICS AND GYNECOLOGY | Age: 30
End: 2025-04-23
Payer: COMMERCIAL

## 2025-04-23 VITALS
WEIGHT: 186 LBS | HEART RATE: 82 BPM | DIASTOLIC BLOOD PRESSURE: 82 MMHG | HEIGHT: 67 IN | BODY MASS INDEX: 29.19 KG/M2 | SYSTOLIC BLOOD PRESSURE: 116 MMHG

## 2025-04-23 DIAGNOSIS — F33.1 MAJOR DEPRESSIVE DISORDER, RECURRENT, MODERATE: ICD-10-CM

## 2025-04-23 DIAGNOSIS — Z30.09 ENCOUNTER FOR OTHER GENERAL COUNSELING OR ADVICE ON CONTRACEPTION: Primary | ICD-10-CM

## 2025-04-23 DIAGNOSIS — N90.89 VULVAR IRRITATION: ICD-10-CM

## 2025-04-23 DIAGNOSIS — E10.65 TYPE 1 DIABETES MELLITUS WITH HYPERGLYCEMIA: ICD-10-CM

## 2025-04-23 DIAGNOSIS — F41.9 ANXIETY: ICD-10-CM

## 2025-04-23 RX ORDER — FLUCONAZOLE 150 MG/1
TABLET ORAL
Qty: 2 TABLET | Refills: 0 | Status: SHIPPED | OUTPATIENT
Start: 2025-04-23

## 2025-04-25 ENCOUNTER — TELEPHONE (OUTPATIENT)
Dept: OBSTETRICS AND GYNECOLOGY | Age: 30
End: 2025-04-25
Payer: COMMERCIAL

## 2025-04-25 ENCOUNTER — PATIENT ROUNDING (BHMG ONLY) (OUTPATIENT)
Dept: OBSTETRICS AND GYNECOLOGY | Age: 30
End: 2025-04-25
Payer: COMMERCIAL

## 2025-05-19 ENCOUNTER — TELEPHONE (OUTPATIENT)
Dept: PSYCHIATRY | Facility: CLINIC | Age: 30
End: 2025-05-19
Payer: COMMERCIAL

## 2025-05-19 NOTE — TELEPHONE ENCOUNTER
Lvm and sent my chart message in regards to initial appointment with Santy Rushing has been cancelled.